# Patient Record
Sex: FEMALE | Race: WHITE | Employment: OTHER | URBAN - METROPOLITAN AREA
[De-identification: names, ages, dates, MRNs, and addresses within clinical notes are randomized per-mention and may not be internally consistent; named-entity substitution may affect disease eponyms.]

---

## 2019-01-31 ENCOUNTER — APPOINTMENT (OUTPATIENT)
Dept: GENERAL RADIOLOGY | Facility: HOSPITAL | Age: 44
DRG: 101 | End: 2019-01-31
Attending: EMERGENCY MEDICINE
Payer: COMMERCIAL

## 2019-01-31 ENCOUNTER — APPOINTMENT (OUTPATIENT)
Dept: CT IMAGING | Facility: HOSPITAL | Age: 44
DRG: 101 | End: 2019-01-31
Attending: EMERGENCY MEDICINE
Payer: COMMERCIAL

## 2019-01-31 ENCOUNTER — HOSPITAL ENCOUNTER (INPATIENT)
Facility: HOSPITAL | Age: 44
LOS: 1 days | Discharge: HOME | DRG: 101 | End: 2019-02-02
Attending: EMERGENCY MEDICINE | Admitting: HOSPITALIST
Payer: COMMERCIAL

## 2019-01-31 DIAGNOSIS — R03.0 ELEVATED BLOOD PRESSURE READING: ICD-10-CM

## 2019-01-31 DIAGNOSIS — S02.32XA CLOSED FRACTURE OF LEFT ORBITAL FLOOR, INITIAL ENCOUNTER (HCC): ICD-10-CM

## 2019-01-31 DIAGNOSIS — S09.90XA INJURY OF HEAD, INITIAL ENCOUNTER: ICD-10-CM

## 2019-01-31 DIAGNOSIS — R56.9 SEIZURE (HCC): Primary | ICD-10-CM

## 2019-01-31 PROBLEM — R73.9 HYPERGLYCEMIA: Status: ACTIVE | Noted: 2019-01-31

## 2019-01-31 PROBLEM — E87.20 METABOLIC ACIDOSIS: Status: ACTIVE | Noted: 2019-01-31

## 2019-01-31 PROBLEM — E87.2 METABOLIC ACIDOSIS: Status: ACTIVE | Noted: 2019-01-31

## 2019-01-31 LAB
ANION GAP SERPL CALC-SCNC: 15 MMOL/L (ref 0–18)
B-HCG UR QL: NEGATIVE
BASOPHILS # BLD AUTO: 0.06 X10(3) UL (ref 0–0.2)
BASOPHILS NFR BLD AUTO: 0.6 %
BILIRUB UR QL: NEGATIVE
BUN SERPL-MCNC: 15 MG/DL (ref 8–20)
BUN/CREAT SERPL: 16.9 (ref 10–20)
CALCIUM SERPL-MCNC: 9.4 MG/DL (ref 8.5–10.5)
CANNABINOIDS UR QL SCN: DETECTED
CHLORIDE SERPL-SCNC: 103 MMOL/L (ref 95–110)
CO2 SERPL-SCNC: 19 MMOL/L (ref 22–32)
COLOR UR: YELLOW
CREAT SERPL-MCNC: 0.89 MG/DL (ref 0.5–1.5)
DEPRECATED RDW RBC AUTO: 47.7 FL (ref 35.1–46.3)
EOSINOPHIL # BLD AUTO: 0.18 X10(3) UL (ref 0–0.7)
EOSINOPHIL NFR BLD AUTO: 1.7 %
ERYTHROCYTE [DISTWIDTH] IN BLOOD BY AUTOMATED COUNT: 13.9 % (ref 11–15)
ETHANOL SERPL-MCNC: 1 MG/DL
GLUCOSE SERPL-MCNC: 106 MG/DL (ref 70–99)
GLUCOSE UR-MCNC: NEGATIVE MG/DL
HCT VFR BLD AUTO: 40.5 % (ref 35–48)
HGB BLD-MCNC: 13.5 G/DL (ref 12–16)
IMM GRANULOCYTES # BLD AUTO: 0.05 X10(3) UL (ref 0–1)
IMM GRANULOCYTES NFR BLD: 0.5 %
KETONES UR-MCNC: NEGATIVE MG/DL
LEUKOCYTE ESTERASE UR QL STRIP.AUTO: NEGATIVE
LYMPHOCYTES # BLD AUTO: 2.99 X10(3) UL (ref 1–4)
LYMPHOCYTES NFR BLD AUTO: 27.8 %
MCH RBC QN AUTO: 30.8 PG (ref 26–34)
MCHC RBC AUTO-ENTMCNC: 33.3 G/DL (ref 31–37)
MCV RBC AUTO: 92.5 FL (ref 80–100)
MONOCYTES # BLD AUTO: 0.69 X10(3) UL (ref 0.1–1)
MONOCYTES NFR BLD AUTO: 6.4 %
NEUTROPHILS # BLD AUTO: 6.78 X10 (3) UL (ref 1.5–7.7)
NEUTROPHILS # BLD AUTO: 6.78 X10(3) UL (ref 1.5–7.7)
NEUTROPHILS NFR BLD AUTO: 63 %
NITRITE UR QL STRIP.AUTO: NEGATIVE
OSMOLALITY UR CALC.SUM OF ELEC: 285 MOSM/KG (ref 275–295)
PH UR: 5 [PH] (ref 5–8)
PLATELET # BLD AUTO: 598 10(3)UL (ref 150–450)
POTASSIUM SERPL-SCNC: 3.9 MMOL/L (ref 3.3–5.1)
PROT UR-MCNC: NEGATIVE MG/DL
RBC # BLD AUTO: 4.38 X10(6)UL (ref 3.8–5.3)
RBC #/AREA URNS AUTO: 4 /HPF
SODIUM SERPL-SCNC: 137 MMOL/L (ref 136–144)
SP GR UR STRIP: 1.02 (ref 1–1.03)
TROPONIN I SERPL-MCNC: 0 NG/ML (ref ?–0.03)
UROBILINOGEN UR STRIP-ACNC: <2
VIT C UR-MCNC: NEGATIVE MG/DL
WBC # BLD AUTO: 10.8 X10(3) UL (ref 4–11)
WBC #/AREA URNS AUTO: 3 /HPF

## 2019-01-31 PROCEDURE — 72125 CT NECK SPINE W/O DYE: CPT | Performed by: EMERGENCY MEDICINE

## 2019-01-31 PROCEDURE — 71045 X-RAY EXAM CHEST 1 VIEW: CPT | Performed by: EMERGENCY MEDICINE

## 2019-01-31 PROCEDURE — 99223 1ST HOSP IP/OBS HIGH 75: CPT | Performed by: OTHER

## 2019-01-31 PROCEDURE — 70450 CT HEAD/BRAIN W/O DYE: CPT | Performed by: EMERGENCY MEDICINE

## 2019-01-31 PROCEDURE — 70486 CT MAXILLOFACIAL W/O DYE: CPT | Performed by: EMERGENCY MEDICINE

## 2019-01-31 PROCEDURE — 3E023GC INTRODUCTION OF OTHER THERAPEUTIC SUBSTANCE INTO MUSCLE, PERCUTANEOUS APPROACH: ICD-10-PCS | Performed by: HOSPITALIST

## 2019-01-31 PROCEDURE — 99223 1ST HOSP IP/OBS HIGH 75: CPT | Performed by: HOSPITALIST

## 2019-01-31 RX ORDER — BUPROPION HYDROCHLORIDE 300 MG/1
450 TABLET ORAL DAILY
Status: ON HOLD | COMMUNITY
End: 2019-02-02

## 2019-01-31 RX ORDER — BUPROPION HYDROCHLORIDE 150 MG/1
450 TABLET ORAL DAILY
Status: DISCONTINUED | OUTPATIENT
Start: 2019-02-01 | End: 2019-02-01

## 2019-01-31 RX ORDER — ONDANSETRON 2 MG/ML
4 INJECTION INTRAMUSCULAR; INTRAVENOUS EVERY 6 HOURS PRN
Status: DISCONTINUED | OUTPATIENT
Start: 2019-01-31 | End: 2019-02-02

## 2019-01-31 RX ORDER — ALPRAZOLAM 1 MG/1
1 TABLET ORAL 3 TIMES DAILY PRN
Status: DISCONTINUED | OUTPATIENT
Start: 2019-01-31 | End: 2019-02-01

## 2019-01-31 RX ORDER — LORAZEPAM 2 MG/ML
1 INJECTION INTRAMUSCULAR ONCE
Status: COMPLETED | OUTPATIENT
Start: 2019-01-31 | End: 2019-01-31

## 2019-01-31 RX ORDER — LORAZEPAM 2 MG/ML
2 INJECTION INTRAMUSCULAR
Status: DISCONTINUED | OUTPATIENT
Start: 2019-01-31 | End: 2019-02-02

## 2019-01-31 RX ORDER — ESCITALOPRAM OXALATE 20 MG/1
20 TABLET ORAL DAILY
COMMUNITY

## 2019-01-31 RX ORDER — ALPRAZOLAM 1 MG/1
1 TABLET ORAL 3 TIMES DAILY PRN
Status: ON HOLD | COMMUNITY
End: 2019-02-02

## 2019-01-31 RX ORDER — ESCITALOPRAM OXALATE 10 MG/1
20 TABLET ORAL DAILY
Status: DISCONTINUED | OUTPATIENT
Start: 2019-02-01 | End: 2019-02-02

## 2019-01-31 RX ORDER — ACETAMINOPHEN 325 MG/1
650 TABLET ORAL EVERY 6 HOURS PRN
Status: DISCONTINUED | OUTPATIENT
Start: 2019-01-31 | End: 2019-02-02

## 2019-01-31 RX ORDER — SODIUM CHLORIDE 9 MG/ML
INJECTION, SOLUTION INTRAVENOUS CONTINUOUS
Status: DISCONTINUED | OUTPATIENT
Start: 2019-01-31 | End: 2019-02-02

## 2019-01-31 RX ORDER — HYDROCODONE BITARTRATE AND ACETAMINOPHEN 10; 325 MG/1; MG/1
1 TABLET ORAL EVERY 6 HOURS PRN
Status: DISCONTINUED | OUTPATIENT
Start: 2019-01-31 | End: 2019-02-02

## 2019-01-31 RX ORDER — MELOXICAM 7.5 MG/1
15 TABLET ORAL DAILY
COMMUNITY

## 2019-01-31 RX ORDER — SODIUM CHLORIDE 0.9 % (FLUSH) 0.9 %
3 SYRINGE (ML) INJECTION AS NEEDED
Status: DISCONTINUED | OUTPATIENT
Start: 2019-01-31 | End: 2019-02-02

## 2019-01-31 RX ORDER — ATORVASTATIN CALCIUM 10 MG/1
10 TABLET, FILM COATED ORAL DAILY
Status: DISCONTINUED | OUTPATIENT
Start: 2019-02-01 | End: 2019-02-02

## 2019-01-31 RX ORDER — SIMVASTATIN 20 MG
20 TABLET ORAL DAILY
COMMUNITY
End: 2020-02-18

## 2019-01-31 RX ORDER — BUPROPION HYDROCHLORIDE 100 MG/1
450 TABLET ORAL DAILY
Status: ON HOLD | COMMUNITY
End: 2019-01-31 | Stop reason: CLARIF

## 2019-01-31 RX ORDER — HYDROCODONE BITARTRATE AND ACETAMINOPHEN 10; 325 MG/1; MG/1
1 TABLET ORAL EVERY 6 HOURS PRN
COMMUNITY
End: 2021-11-18

## 2019-01-31 NOTE — ED INITIAL ASSESSMENT (HPI)
Pt was a work as a caretaker. Per EMS she fell on the ground and started shaking. Pt denies hx of seizures. Lac noted to right eyebrow and bridge of nose. Brushing noted to right eye.

## 2019-01-31 NOTE — ED PROVIDER NOTES
Patient Seen in: [facility] Emergency Department    History   Patient presents with:  Seizure Disorder (neurologic)    Stated Complaint: seizure    HPI    Patient presents the emergency department after having a seizure at work.   She states that s step-off. Eyes: Conjunctivae and EOM are normal. Pupils are equal, round, and reactive to light. Neck: Normal range of motion. Neck supple. Cardiovascular: Normal rate and regular rhythm. No murmur heard.   Pulmonary/Chest: Effort normal and breath view results for these tests on the individual orders.    RAINBOW DRAW BLUE   RAINBOW DRAW LAVENDER   RAINBOW DRAW DARK GREEN   RAINBOW DRAW LIGHT GREEN   RAINBOW DRAW GOLD   RAINBOW DRAW LAVENDER TALL (BNP)     EKG    Rate, intervals and axes as noted on E 1/31/2019  CONCLUSION: Mild interstitial edema.     Dictated by (CST): Vicente Luna MD on 1/31/2019 at 18:20     Approved by (CST): Vicente Luna MD on 1/31/2019 at 18:21            Radiology exams  Viewed and reviewed by myself and findings discussed with shauna

## 2019-02-01 ENCOUNTER — APPOINTMENT (OUTPATIENT)
Dept: MRI IMAGING | Facility: HOSPITAL | Age: 44
DRG: 101 | End: 2019-02-01
Attending: Other
Payer: COMMERCIAL

## 2019-02-01 LAB
ANION GAP SERPL CALC-SCNC: 9 MMOL/L (ref 0–18)
BASOPHILS # BLD AUTO: 0.05 X10(3) UL (ref 0–0.2)
BASOPHILS NFR BLD AUTO: 0.5 %
BUN SERPL-MCNC: 14 MG/DL (ref 8–20)
BUN/CREAT SERPL: 19.4 (ref 10–20)
CALCIUM SERPL-MCNC: 8.7 MG/DL (ref 8.5–10.5)
CHLORIDE SERPL-SCNC: 105 MMOL/L (ref 95–110)
CO2 SERPL-SCNC: 22 MMOL/L (ref 22–32)
CREAT SERPL-MCNC: 0.72 MG/DL (ref 0.5–1.5)
DEPRECATED RDW RBC AUTO: 49 FL (ref 35.1–46.3)
EOSINOPHIL # BLD AUTO: 0.3 X10(3) UL (ref 0–0.7)
EOSINOPHIL NFR BLD AUTO: 3 %
ERYTHROCYTE [DISTWIDTH] IN BLOOD BY AUTOMATED COUNT: 14.2 % (ref 11–15)
GLUCOSE SERPL-MCNC: 93 MG/DL (ref 70–99)
HCT VFR BLD AUTO: 39.9 % (ref 35–48)
HGB BLD-MCNC: 13.1 G/DL (ref 12–16)
IMM GRANULOCYTES # BLD AUTO: 0.04 X10(3) UL (ref 0–1)
IMM GRANULOCYTES NFR BLD: 0.4 %
LYMPHOCYTES # BLD AUTO: 3.34 X10(3) UL (ref 1–4)
LYMPHOCYTES NFR BLD AUTO: 32.9 %
MAGNESIUM SERPL-MCNC: 2.1 MG/DL (ref 1.8–2.5)
MCH RBC QN AUTO: 31 PG (ref 26–34)
MCHC RBC AUTO-ENTMCNC: 32.8 G/DL (ref 31–37)
MCV RBC AUTO: 94.3 FL (ref 80–100)
MONOCYTES # BLD AUTO: 0.77 X10(3) UL (ref 0.1–1)
MONOCYTES NFR BLD AUTO: 7.6 %
NEUTROPHILS # BLD AUTO: 5.64 X10 (3) UL (ref 1.5–7.7)
NEUTROPHILS # BLD AUTO: 5.64 X10(3) UL (ref 1.5–7.7)
NEUTROPHILS NFR BLD AUTO: 55.6 %
OSMOLALITY UR CALC.SUM OF ELEC: 282 MOSM/KG (ref 275–295)
PLATELET # BLD AUTO: 523 10(3)UL (ref 150–450)
POTASSIUM SERPL-SCNC: 3.5 MMOL/L (ref 3.3–5.1)
RBC # BLD AUTO: 4.23 X10(6)UL (ref 3.8–5.3)
SODIUM SERPL-SCNC: 136 MMOL/L (ref 136–144)
WBC # BLD AUTO: 10.1 X10(3) UL (ref 4–11)

## 2019-02-01 PROCEDURE — 99233 SBSQ HOSP IP/OBS HIGH 50: CPT | Performed by: HOSPITALIST

## 2019-02-01 PROCEDURE — 70553 MRI BRAIN STEM W/O & W/DYE: CPT | Performed by: OTHER

## 2019-02-01 PROCEDURE — 99233 SBSQ HOSP IP/OBS HIGH 50: CPT | Performed by: OTHER

## 2019-02-01 PROCEDURE — 90792 PSYCH DIAG EVAL W/MED SRVCS: CPT | Performed by: OTHER

## 2019-02-01 PROCEDURE — 95816 EEG AWAKE AND DROWSY: CPT | Performed by: OTHER

## 2019-02-01 RX ORDER — QUETIAPINE 25 MG/1
50 TABLET, FILM COATED ORAL NIGHTLY
Status: DISCONTINUED | OUTPATIENT
Start: 2019-02-01 | End: 2019-02-02

## 2019-02-01 RX ORDER — LAMOTRIGINE 25 MG/1
25 TABLET ORAL 2 TIMES DAILY
Status: DISCONTINUED | OUTPATIENT
Start: 2019-02-01 | End: 2019-02-02

## 2019-02-01 RX ORDER — CHLORDIAZEPOXIDE HYDROCHLORIDE 5 MG/1
5 CAPSULE, GELATIN COATED ORAL EVERY 8 HOURS PRN
Status: DISCONTINUED | OUTPATIENT
Start: 2019-02-01 | End: 2019-02-02

## 2019-02-01 RX ORDER — HALOPERIDOL 5 MG/ML
1 INJECTION INTRAMUSCULAR EVERY 6 HOURS PRN
Status: DISCONTINUED | OUTPATIENT
Start: 2019-02-01 | End: 2019-02-02

## 2019-02-01 NOTE — CONSULTS
[facility]    PATIENT'S NAME: Beverley Overall PHYSICIAN: Jerry Geiger MD   CONSULTING PHYSICIAN: Lesvia Bernal MD   PATIENT ACCOUNT#:   [id number]    LOCATION:  [address] [place] [address] RECORD #:   [id number]       DATE OF BIRTH:  [birth year] head report reviewed. IMPRESSION:  This is probably a Xanax withdrawal seizure. Will order an MRI of the brain, EEG.   Discussed the fact that I believe she should follow up with Psychiatry in dealing with her depression, anxiety, appropriate medication

## 2019-02-01 NOTE — ED NOTES
Orders for admission, patient is aware of plan and ready to go upstairs.  Any questions, please call ED RN Juanita Read  at extension [phone]    A/o 4, ambulatory with standby assist.

## 2019-02-01 NOTE — PROGRESS NOTES
[facility]    Progress Note    Rayna San Saba Patient Status:  Inpatient    3/15/1975 MRN [id number]   Location [address] [facility] Attending Stuart Jaramillo MD   Hosp Day # 0 PCP [facility]       Subjective:    Rayna Titus is a(n) soft tissue within the left maxillary sinus which may represent inspissated mucous versus polyp. No acute intracranial abnormality.      Dictated by (CST): Arina Bell MD on 1/31/2019 at 18:06     Approved by (CST): Arina Bell MD on 1/31/2019 at 18:14 MD on 1/31/2019 at 18:20     Approved by (CST): Ovidio Rushing MD on 1/31/2019 at 18:21          Ekg 12-lead    Result Date: 1/31/2019  ECG Report  Interpretation  -------------------------- Sinus Rhythm WITHIN NORMAL LIMITS No previous ECGs available Electr

## 2019-02-01 NOTE — SPIRITUAL CARE NOTE
Was with doctor in room. A  will check back tomorrow, or call [phone] if PT ready for visit.  Thank you, GG

## 2019-02-01 NOTE — PLAN OF CARE
ANXIETY    • Will report anxiety at manageable levels Progressing        COPING    • Pt/Family able to verbalize concerns and demonstrate effective coping strategies Progressing        NEUROLOGICAL - ADULT    • Achieves stable or improved neurological stat

## 2019-02-01 NOTE — PROGRESS NOTES
[facility]    Progress Note    Cuauhtemoc Simons Patient Status:  Inpatient    3/15/1975 MRN [id number]   Location [address] [facility] Attending Alisson Doran MD   Hosp Day # 0 PCP [facility]       Subjective:    Cuauhtemoc Simons is a(n) Review of Systems:   GENERAL HEALTH: feels well otherwise  SKIN: denies any unusual skin lesions or rashes  EYES: no visual complaints or deficits  HEENT: denies nasal congestion, sinus pain or sore throat; hearing loss negative  RESPIRATORY: denies 1/31/2019 at 18:49     Approved by (CST): Evelyn Christy MD on 1/31/2019 at 19:01          Ct Spine Cervical (cpt=72125)    Result Date: 1/31/2019  CONCLUSION:  1. No acute fracture or acute malalignment.  2. Multilevel cervical spondylosis most pronou

## 2019-02-01 NOTE — BH PROGRESS NOTE
Behavioral Health Note  CHIEF COMPLAINT  Acute seizure  REASON FOR ADMISSION  Acute seizure    SOCIAL HISTORY  Jonnie Lei lives with her . 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  She smokes 1/2 pack per day, occasional drinks alcoh communication, no physical connection, no emotional connection and she reports that her  is an alcoholic. She reports that the stress began 10 year ago when her  lost his job in real estate and their home was foreclosed on.  She reports cont

## 2019-02-01 NOTE — H&P
[facility]    PATIENT'S NAME: Veronika Sanchez   ATTENDING PHYSICIAN: Ace Urrutia.  Mckinley Naik MD   PATIENT ACCOUNT#:   [id number]    LOCATION:  [place] [address]  MEDICAL RECORD #:   [id number]       YOB: 1975  ADMISSION DATE:       01/31/ was wrapping up at work, she went to start her car, and then she does not remember what happened. She remembers getting in the emergency room. According to witnesses, the patient had a grand mal convulsive seizure.   Other 12-point review of systems is ne

## 2019-02-02 VITALS
TEMPERATURE: 97 F | HEIGHT: 67 IN | OXYGEN SATURATION: 94 % | HEART RATE: 65 BPM | SYSTOLIC BLOOD PRESSURE: 114 MMHG | RESPIRATION RATE: 18 BRPM | BODY MASS INDEX: 33.79 KG/M2 | WEIGHT: 215.31 LBS | DIASTOLIC BLOOD PRESSURE: 73 MMHG

## 2019-02-02 LAB
ANION GAP SERPL CALC-SCNC: 7 MMOL/L (ref 0–18)
BASOPHILS # BLD AUTO: 0.06 X10(3) UL (ref 0–0.2)
BASOPHILS NFR BLD AUTO: 0.6 %
BUN SERPL-MCNC: 14 MG/DL (ref 8–20)
BUN/CREAT SERPL: 17.3 (ref 10–20)
CALCIUM SERPL-MCNC: 9 MG/DL (ref 8.5–10.5)
CHLORIDE SERPL-SCNC: 106 MMOL/L (ref 95–110)
CO2 SERPL-SCNC: 25 MMOL/L (ref 22–32)
CREAT SERPL-MCNC: 0.81 MG/DL (ref 0.5–1.5)
DEPRECATED RDW RBC AUTO: 51.8 FL (ref 35.1–46.3)
EOSINOPHIL # BLD AUTO: 0.5 X10(3) UL (ref 0–0.7)
EOSINOPHIL NFR BLD AUTO: 5.2 %
ERYTHROCYTE [DISTWIDTH] IN BLOOD BY AUTOMATED COUNT: 14 % (ref 11–15)
GLUCOSE SERPL-MCNC: 90 MG/DL (ref 70–99)
HCT VFR BLD AUTO: 42.5 % (ref 35–48)
HGB BLD-MCNC: 13.4 G/DL (ref 12–16)
IMM GRANULOCYTES # BLD AUTO: 0.03 X10(3) UL (ref 0–1)
IMM GRANULOCYTES NFR BLD: 0.3 %
LYMPHOCYTES # BLD AUTO: 2.81 X10(3) UL (ref 1–4)
LYMPHOCYTES NFR BLD AUTO: 29.3 %
MAGNESIUM SERPL-MCNC: 2 MG/DL (ref 1.8–2.5)
MCH RBC QN AUTO: 31.1 PG (ref 26–34)
MCHC RBC AUTO-ENTMCNC: 31.5 G/DL (ref 31–37)
MCV RBC AUTO: 98.6 FL (ref 80–100)
MONOCYTES # BLD AUTO: 0.67 X10(3) UL (ref 0.1–1)
MONOCYTES NFR BLD AUTO: 7 %
NEUTROPHILS # BLD AUTO: 5.51 X10 (3) UL (ref 1.5–7.7)
NEUTROPHILS # BLD AUTO: 5.51 X10(3) UL (ref 1.5–7.7)
NEUTROPHILS NFR BLD AUTO: 57.6 %
OSMOLALITY UR CALC.SUM OF ELEC: 286 MOSM/KG (ref 275–295)
PLATELET # BLD AUTO: 493 10(3)UL (ref 150–450)
POTASSIUM SERPL-SCNC: 4.6 MMOL/L (ref 3.3–5.1)
POTASSIUM SERPL-SCNC: 4.6 MMOL/L (ref 3.3–5.1)
RBC # BLD AUTO: 4.31 X10(6)UL (ref 3.8–5.3)
SODIUM SERPL-SCNC: 138 MMOL/L (ref 136–144)
WBC # BLD AUTO: 9.6 X10(3) UL (ref 4–11)

## 2019-02-02 PROCEDURE — 99254 IP/OBS CNSLTJ NEW/EST MOD 60: CPT | Performed by: OTOLARYNGOLOGY

## 2019-02-02 PROCEDURE — 92511 NASOPHARYNGOSCOPY: CPT | Performed by: OTOLARYNGOLOGY

## 2019-02-02 PROCEDURE — 99239 HOSP IP/OBS DSCHRG MGMT >30: CPT | Performed by: HOSPITALIST

## 2019-02-02 RX ORDER — LAMOTRIGINE 25 MG/1
25 TABLET ORAL 2 TIMES DAILY
Qty: 60 TABLET | Refills: 0 | Status: SHIPPED | OUTPATIENT
Start: 2019-02-02 | End: 2019-03-04

## 2019-02-02 RX ORDER — QUETIAPINE 50 MG/1
50 TABLET, FILM COATED ORAL NIGHTLY
Qty: 30 TABLET | Refills: 0 | Status: SHIPPED | OUTPATIENT
Start: 2019-02-02 | End: 2019-03-04

## 2019-02-02 NOTE — PLAN OF CARE
Problem: Patient Centered Care  Goal: Patient preferences are identified and integrated in the patient's plan of care  Interventions:  - What would you like us to know as we care for you?  \"I had a seizure, how did that happen, I've never had seizures befo appropriate  Outcome: Progressing      Problem: NEUROLOGICAL - ADULT  Goal: Achieves stable or improved neurological status  INTERVENTIONS  - Assess for and report changes in neurological status  - Initiate measures to prevent increased intracranial pressu manageable levels  INTERVENTIONS:  - Administer medication as ordered  - Teach and rehearse alternative coping skills  - Provide emotional support with 1:1 interaction with staff  Outcome: Progressing      Problem: COPING  Goal: Pt/Family able to verbalize

## 2019-02-02 NOTE — PROCEDURES
[address] [facility] [address]      PATIENT'S NAME: Juana Brunner   ATTENDING PHYSICIAN: Ria Ann MD   PATIENT ACCOUNT #: [de-identified] LOCATION: [place] [address] RECORD #: [id number] DATE OF BIRTH: [birth year]

## 2019-02-02 NOTE — DISCHARGE SUMMARY
[facility]    Discharge Summary    Pammandie Enrique Patient Status:  Inpatient    3/15/1975 MRN [id number]   Location [address] [facility] Attending Roro Lobato MD   Hosp Day # 1 PCP Peter Coley     Date of Admission: 2019 Henri Ndiaye COMPLAINT:  Acute seizure. HISTORY OF PRESENT ILLNESS:  The patient is a 57-year-old  female who had a witnessed convulsive seizure earlier today. She was brought in to the emergency department for evaluation.   Chemistry showed bicarbonate of 19 Prescription details   escitalopram 20 MG Tabs  Commonly known as:  LEXAPRO      Take 20 mg by mouth daily.    Refills:  0     HYDROcodone-acetaminophen  MG Tabs  Commonly known as:  Priya Fulton  Notes to patient:  Last dose given 2/2 at 7:35 am      Take 1

## 2019-02-02 NOTE — PLAN OF CARE
Problem: Patient Centered Care  Goal: Patient preferences are identified and integrated in the patient's plan of care  Interventions:  - What would you like us to know as we care for you?  \"I had a seizure, how did that happen, I've never had seizures befo appropriate  Outcome: Adequate for Discharge      Problem: NEUROLOGICAL - ADULT  Goal: Achieves stable or improved neurological status  INTERVENTIONS  - Assess for and report changes in neurological status  - Initiate measures to prevent increased intracra ANXIETY  Goal: Will report anxiety at manageable levels  INTERVENTIONS:  - Administer medication as ordered  - Teach and rehearse alternative coping skills  - Provide emotional support with 1:1 interaction with staff  Outcome: Adequate for Discharge      P

## 2019-02-02 NOTE — PLAN OF CARE
Problem: Patient Centered Care  Goal: Patient preferences are identified and integrated in the patient's plan of care  Interventions:  - What would you like us to know as we care for you?  \"I had a seizure, how did that happen, I've never had seizures befo increased pain with activity and pre-medicate as appropriate  Outcome: Progressing  Facial pain and headaches d/t fractures of left orbital and right nasal. PRN norco given for pain.       Problem: NEUROLOGICAL - ADULT  Goal: Achieves stable or improved juliet appropriate  - Consider OT/PT consult to assist with strengthening/mobility  - Encourage toileting schedule  Outcome: Progressing  Up with assist, no assistive device, calls appropriately. Ambulates with a strong and steady gait.      Problem: ANXIETY  Goal

## 2019-02-02 NOTE — CONSULTS
[facility]    Report of Consultation    Aubree Weiss Patient Status:  Inpatient    3/15/1975 MRN [id number]   Location [address] [facility] Attending Mei Bellamy MD   Hosp Day # 1 PCP Yuly Lopes     Date of Admission:   Problem Relation Age of Onset   • Cancer Father    • Hypertension Father    • Depression Mother    • Anxiety Mother    • Depression Daughter    • Anxiety Sister        Social History  Patient Guardian Status:  Not on file    Other Topics            Jessie comprehensive review of systems was negative. Physical Exam:   Blood pressure 119/79, pulse 67, temperature 97.7 °F (36.5 °C), temperature source Oral, resp.  rate 18, height 5' 7\" (1.702 m), weight 215 lb 4.8 oz (97.7 kg), last menstrual period 01/10/2 demonstrated mucosal swelling as well as some debris. No masses or lesions are noted in the nasal cavity. The scope was driven into the nasopharynx demonstrating bilateral soft tissue structures which appear to be adenoidal tissue.   There was some old bl visible lesion. SINUSES: There is mild mucosal thickening of the ethmoid air cells. Moderate mucosal thickening is seen of the left maxillary sinus with hyperdense fluid and debris measuring up to 73 Hounsfield units.  ORBITS: Limited views are unremarkabl unremarkable. UPPER AIRWAY: Normal.  The nasopharynx, oropharynx, and oral cavity are unremarkable. NECK: No lymphadenopathy. OTHER: Advanced arthropathy bilateral TMJs.                                            Cavernous carotid vascular calcifications C1-C2: Advanced C1-2 arthropathy between the lateral masses and anterior arch/tip at the dens. C2-C3: Mild diffuse disc bulge without significant thecal sac effacement or.  Moderate left hypertrophic facet arthrosis C3-C4: Mild central disc bulge C4-C5: Mil cisterns, and sulci are appropriate for age. No hydrocephalus, subarachnoid hemorrhage, or mass. SKULL: No mass or other significant visible lesion. SINUSES: Mild mucosal thickening in ethmoid sinuses. Small maxillary sinus retention cysts.  Trace mastoi left orbital floor fracture. Relatively nondisplaced nasal fracture. Nasopharyngeal soft tissues most likely adenoidal tissue. Recommendations:  I reviewed her CT images which demonstrates the above-mentioned orbital and nasal fractures.   In addition

## 2019-02-03 NOTE — BH PROGRESS NOTE
Met with pt to schedule appointment to begin PHP dual at [facility], pt stated she need a program closer to her home on [address]. Patient also requires transportation due to seizures.  Patient given [facility]

## 2019-02-04 NOTE — PAYOR COMM NOTE
--------------  ADMISSION REVIEW     Payor: [facility]  Subscriber #:  [id number]  Authorization Number: [id number]    Admit date: 2/1/19  Admit time: 7078  DISCHARGED 2/2         Admitting Physician: Shahzad Kirby MD  Attending Physician: Temp 98.7 °F (37.1 °C)   Temp src Oral   SpO2 99 %   O2 Device None (Room air)       Current:BP (!) 159/100   Pulse 70   Temp 98.7 °F (37.1 °C) (Oral)   Resp 18   Ht 170.2 cm (5' 7\")   Wt 95.3 kg   LMP 01/10/2019   SpO2 98%   BMI 32.89 kg/m²          Phys DRUG SCREEN 7 W/OUT CONFIRMATION, URINE - Abnormal; Notable for the following components:    UR. Marijuana Screen Detected (*)     All other components within normal limits   CBC W/ DIFFERENTIAL - Abnormal; Notable for the following components:    RDW-SD 4 CONCLUSION:  1. Subtle left orbital floor fracture with minimal 2 mm depression. No optic nerve or muscle entrapment. 2. Mucosal thickening bilateral maxillary sinuses. Small amount of hemorrhage left maxillary sinus.  3. Minimally depressed left nasal bone H&P signed by Medina Vee MD at 2/1/2019  2:08 PM      Author:  Medina Vee MD Service:  Hospitalist Author Type:  Physician    Filed:  2/1/2019  2:08 PM Status:  Signed    :  Medina Vee MD (Physician)         [facility] SOCIAL HISTORY:  She does not smoke. Occasional alcohol. She smokes marijuana on occasion. 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     REVIEW OF SYSTEMS:  The patient said that prior to the seizure she was wrappi Dictated By Merrick Eng MD  d: 01/31/2019 18:50:38  t: 01/31/2019 19:30:30  Job [id number]  XA/    Electronically signed by Amparo Sanchez MD on 2/1/2019  2:08 PM       Albert Bettencourt MD   Physician   Hospitalist   Progress Notes   [place]    02/01/2019     CO2 22 02/01/2019     GLU 93 02/01/2019     CA 8.7 02/01/2019     MG 2.1 02/01/2019     TROP 0.00 01/31/2019     ETOH 1 01/31/2019         Ct Brain Or Head ([id number])     Result Date: 1/31/2019  CONCLUSION:   Mild right supraorbital an CONCLUSION:  1. No acute infarct or other acute finding. 2. Marked nonspecific thickening of the posterior nasopharyngeal soft tissues with narrowing of the nasopharyngeal airway.  Differential considerations include atypical benign hyperplasia, and less li Patient will require inpatient services that will reasonably be expected to span two midnight's based on the clinical documentation in H+P. Based on patients current state of illness, I anticipate that, after discharge, patient will require TBD. Closed fracture of left orbital floor, initial encounter ([facility] [address]     Moderate depressed bipolar I disorder (HCC)     Generalized anxiety disorder     Nasopharyngeal mass, benign           Physical Exam:   General appearance: alert, appears stated age and Consultations:   Psych  ENT     Procedures:   EEG     Complications: n/a     Discharge Condition: Good     Discharge Medications:               Discharge Medications              START taking these medications      Instructions Prescription details   Juan Jennifer Hendricks MD  2/2/2019  11:23 AM     > 35 min          Signed by Katina Hudson MD on 2/2/2019  1:30 PM

## 2019-02-05 NOTE — PAYOR COMM NOTE
--------------  DISCHARGE REVIEW    Payor: [facility]  Subscriber #:  [id number]  Authorization Number: [id number]    Admit date: 2/1/19  Admit time:  2107  Discharge Date: 2/2/2019  1:51 PM     Admitting Physician: Trinh Slade MD  Attendi blood pressure reading     Closed fracture of left orbital floor, initial encounter (HCC)     Moderate depressed bipolar I disorder (HCC)     Generalized anxiety disorder     Nasopharyngeal mass, benign        Physical Exam:   General appearance: alert, ap acidosis  -resolved        Consultations:   Psych  ENT    Procedures:   EEG    Complications: n/a    Discharge Condition: Good    Discharge Medications:      Discharge Medications      START taking these medications      Instructions Prescription details Electronically signed by Lorena Garcia MD on 2/2/2019  1:30 PM         REVIEWER COMMENTS

## 2019-03-03 NOTE — PAYOR COMM NOTE
--------------  DISCHARGE REVIEW    [facility]  Subscriber #:  [id number]  Authorization Number: [id number]    Admit date: 2/1/19  Admit time:  0679  Discharge Date: 2/2/2019  1:51 PM     Admitting Physician: Noe Diaz MD  Attendi

## 2019-12-05 ENCOUNTER — HOSPITAL ENCOUNTER (OUTPATIENT)
Dept: CT IMAGING | Facility: HOSPITAL | Age: 44
Discharge: HOME | End: 2019-12-05
Attending: ORTHOPAEDIC SURGERY
Payer: COMMERCIAL

## 2019-12-05 ENCOUNTER — LAB REQUISITION (OUTPATIENT)
Dept: LAB | Facility: HOSPITAL | Age: 44
End: 2019-12-05
Payer: COMMERCIAL

## 2019-12-05 ENCOUNTER — LAB ENCOUNTER (OUTPATIENT)
Dept: LAB | Facility: HOSPITAL | Age: 44
End: 2019-12-05
Attending: ORTHOPAEDIC SURGERY
Payer: COMMERCIAL

## 2019-12-05 DIAGNOSIS — Z96.659 PAIN DUE TO TOTAL KNEE REPLACEMENT (HCC): ICD-10-CM

## 2019-12-05 DIAGNOSIS — M17.0 BILATERAL PRIMARY OSTEOARTHRITIS OF KNEE: ICD-10-CM

## 2019-12-05 DIAGNOSIS — Z96.659 HX OF TOTAL KNEE ARTHROPLASTY: ICD-10-CM

## 2019-12-05 DIAGNOSIS — M25.561 RIGHT KNEE PAIN: Primary | ICD-10-CM

## 2019-12-05 DIAGNOSIS — T84.84XA PAIN DUE TO TOTAL KNEE REPLACEMENT (HCC): ICD-10-CM

## 2019-12-05 PROCEDURE — 73700 CT LOWER EXTREMITY W/O DYE: CPT | Performed by: ORTHOPAEDIC SURGERY

## 2019-12-05 PROCEDURE — 87205 SMEAR GRAM STAIN: CPT | Performed by: ORTHOPAEDIC SURGERY

## 2019-12-05 PROCEDURE — 86140 C-REACTIVE PROTEIN: CPT

## 2019-12-05 PROCEDURE — 87070 CULTURE OTHR SPECIMN AEROBIC: CPT | Performed by: ORTHOPAEDIC SURGERY

## 2019-12-05 PROCEDURE — 89060 EXAM SYNOVIAL FLUID CRYSTALS: CPT | Performed by: ORTHOPAEDIC SURGERY

## 2019-12-05 PROCEDURE — 36415 COLL VENOUS BLD VENIPUNCTURE: CPT

## 2019-12-05 PROCEDURE — 89051 BODY FLUID CELL COUNT: CPT | Performed by: ORTHOPAEDIC SURGERY

## 2019-12-05 PROCEDURE — 85025 COMPLETE CBC W/AUTO DIFF WBC: CPT

## 2019-12-05 PROCEDURE — 85652 RBC SED RATE AUTOMATED: CPT

## 2019-12-05 PROCEDURE — 89050 BODY FLUID CELL COUNT: CPT | Performed by: ORTHOPAEDIC SURGERY

## 2020-02-18 RX ORDER — ALPRAZOLAM 1 MG/1
1 TABLET ORAL 3 TIMES DAILY
COMMUNITY

## 2020-02-18 RX ORDER — METOCLOPRAMIDE 10 MG/1
10 TABLET ORAL ONCE
Status: CANCELLED | OUTPATIENT
Start: 2020-02-18 | End: 2020-02-18

## 2020-02-18 RX ORDER — OMEPRAZOLE 20 MG/1
20 CAPSULE, DELAYED RELEASE ORAL EVERY MORNING
COMMUNITY

## 2020-02-20 ENCOUNTER — LAB ENCOUNTER (OUTPATIENT)
Dept: LAB | Facility: HOSPITAL | Age: 45
End: 2020-02-20
Attending: ORTHOPAEDIC SURGERY
Payer: COMMERCIAL

## 2020-02-20 DIAGNOSIS — Z01.818 PRE-OP TESTING: ICD-10-CM

## 2020-02-20 LAB
ANTIBODY SCREEN: NEGATIVE
RH BLOOD TYPE: POSITIVE

## 2020-02-20 PROCEDURE — 86901 BLOOD TYPING SEROLOGIC RH(D): CPT

## 2020-02-20 PROCEDURE — 86900 BLOOD TYPING SEROLOGIC ABO: CPT

## 2020-02-20 PROCEDURE — 36415 COLL VENOUS BLD VENIPUNCTURE: CPT

## 2020-02-20 PROCEDURE — 86850 RBC ANTIBODY SCREEN: CPT

## 2020-02-20 NOTE — H&P
[address] [facility] Patient Status:  Surgery Admit - Inpt    3/15/1975 MRN [id number]   Location [place] [address] Attending Marlin Reddy, *   Hosp Day # 0 PCP Janna Mireles drinks socially-once every month    Drug use: Yes      Frequency: 4.0 times per week      Types: Cannabis      Comment: medical marijuana    Allergies/Medications: Allergies:   Sulfa Antibiotics       RASH  No medications prior to admission.       Review

## 2020-02-25 ENCOUNTER — APPOINTMENT (OUTPATIENT)
Dept: GENERAL RADIOLOGY | Facility: HOSPITAL | Age: 45
DRG: 468 | End: 2020-02-25
Attending: PHYSICIAN ASSISTANT
Payer: COMMERCIAL

## 2020-02-25 ENCOUNTER — ANESTHESIA EVENT (OUTPATIENT)
Dept: SURGERY | Facility: HOSPITAL | Age: 45
DRG: 468 | End: 2020-02-25
Payer: COMMERCIAL

## 2020-02-25 ENCOUNTER — HOSPITAL ENCOUNTER (INPATIENT)
Facility: HOSPITAL | Age: 45
LOS: 3 days | Discharge: HOME HEALTH | DRG: 468 | End: 2020-02-28
Attending: ORTHOPAEDIC SURGERY | Admitting: ORTHOPAEDIC SURGERY
Payer: COMMERCIAL

## 2020-02-25 ENCOUNTER — ANESTHESIA (OUTPATIENT)
Dept: SURGERY | Facility: HOSPITAL | Age: 45
DRG: 468 | End: 2020-02-25
Payer: COMMERCIAL

## 2020-02-25 DIAGNOSIS — Z01.818 PRE-OP TESTING: Primary | ICD-10-CM

## 2020-02-25 PROBLEM — Z96.659 FAILED TOTAL KNEE ARTHROPLASTY: Status: ACTIVE | Noted: 2020-02-25

## 2020-02-25 PROBLEM — T84.018A FAILED TOTAL KNEE ARTHROPLASTY, INITIAL ENCOUNTER (HCC): Status: ACTIVE | Noted: 2020-02-25

## 2020-02-25 PROBLEM — Z96.659 FAILED TOTAL KNEE ARTHROPLASTY, INITIAL ENCOUNTER: Status: ACTIVE | Noted: 2020-02-25

## 2020-02-25 PROBLEM — T84.018A FAILED TOTAL KNEE ARTHROPLASTY, INITIAL ENCOUNTER: Status: ACTIVE | Noted: 2020-02-25

## 2020-02-25 PROBLEM — Z96.659 FAILED TOTAL KNEE ARTHROPLASTY, INITIAL ENCOUNTER (HCC): Status: ACTIVE | Noted: 2020-02-25

## 2020-02-25 PROBLEM — M06.9 RHEUMATOID ARTHRITIS (HCC): Chronic | Status: ACTIVE | Noted: 2020-02-25

## 2020-02-25 PROBLEM — T84.018A FAILED TOTAL KNEE ARTHROPLASTY: Status: ACTIVE | Noted: 2020-02-25

## 2020-02-25 PROBLEM — T84.018A FAILED TOTAL KNEE ARTHROPLASTY (HCC): Status: ACTIVE | Noted: 2020-02-25

## 2020-02-25 PROBLEM — Z96.659 FAILED TOTAL KNEE ARTHROPLASTY (HCC): Status: ACTIVE | Noted: 2020-02-25

## 2020-02-25 LAB
B-HCG UR QL: NEGATIVE
DEPRECATED RDW RBC AUTO: 48.5 FL (ref 35.1–46.3)
ERYTHROCYTE [DISTWIDTH] IN BLOOD BY AUTOMATED COUNT: 14.3 % (ref 11–15)
HCT VFR BLD AUTO: 37.2 % (ref 35–48)
HGB BLD-MCNC: 12.4 G/DL (ref 12–16)
MCH RBC QN AUTO: 31.3 PG (ref 26–34)
MCHC RBC AUTO-ENTMCNC: 33.3 G/DL (ref 31–37)
MCV RBC AUTO: 93.9 FL (ref 80–100)
PLATELET # BLD AUTO: 387 10(3)UL (ref 150–450)
RBC # BLD AUTO: 3.96 X10(6)UL (ref 3.8–5.3)
WBC # BLD AUTO: 11.6 X10(3) UL (ref 4–11)

## 2020-02-25 PROCEDURE — 73560 X-RAY EXAM OF KNEE 1 OR 2: CPT | Performed by: PHYSICIAN ASSISTANT

## 2020-02-25 PROCEDURE — 0SRC0J9 REPLACEMENT OF RIGHT KNEE JOINT WITH SYNTHETIC SUBSTITUTE, CEMENTED, OPEN APPROACH: ICD-10-PCS | Performed by: ORTHOPAEDIC SURGERY

## 2020-02-25 PROCEDURE — 0SPC0JZ REMOVAL OF SYNTHETIC SUBSTITUTE FROM RIGHT KNEE JOINT, OPEN APPROACH: ICD-10-PCS | Performed by: ORTHOPAEDIC SURGERY

## 2020-02-25 PROCEDURE — 0SBC0ZZ EXCISION OF RIGHT KNEE JOINT, OPEN APPROACH: ICD-10-PCS | Performed by: ORTHOPAEDIC SURGERY

## 2020-02-25 PROCEDURE — 99232 SBSQ HOSP IP/OBS MODERATE 35: CPT | Performed by: HOSPITALIST

## 2020-02-25 DEVICE — KIT FEM BONE CEMENT RESTRICTOR: Type: IMPLANTABLE DEVICE | Site: KNEE | Status: FUNCTIONAL

## 2020-02-25 DEVICE — REFOBACIN BC R 1X40 US: Type: IMPLANTABLE DEVICE | Site: KNEE | Status: FUNCTIONAL

## 2020-02-25 RX ORDER — PHENYLEPHRINE HCL 10 MG/ML
VIAL (ML) INJECTION AS NEEDED
Status: DISCONTINUED | OUTPATIENT
Start: 2020-02-25 | End: 2020-02-25 | Stop reason: SURG

## 2020-02-25 RX ORDER — ACETAMINOPHEN 500 MG
1000 TABLET ORAL ONCE
Status: DISCONTINUED | OUTPATIENT
Start: 2020-02-25 | End: 2020-02-25 | Stop reason: HOSPADM

## 2020-02-25 RX ORDER — CEFAZOLIN SODIUM/WATER 2 G/20 ML
2 SYRINGE (ML) INTRAVENOUS ONCE
Status: COMPLETED | OUTPATIENT
Start: 2020-02-25 | End: 2020-02-25

## 2020-02-25 RX ORDER — ACETAMINOPHEN 325 MG/1
650 TABLET ORAL EVERY 4 HOURS PRN
Status: DISCONTINUED | OUTPATIENT
Start: 2020-02-25 | End: 2020-02-26

## 2020-02-25 RX ORDER — PANTOPRAZOLE SODIUM 20 MG/1
20 TABLET, DELAYED RELEASE ORAL
Status: DISCONTINUED | OUTPATIENT
Start: 2020-02-25 | End: 2020-02-28

## 2020-02-25 RX ORDER — HYDROMORPHONE HYDROCHLORIDE 1 MG/ML
0.4 INJECTION, SOLUTION INTRAMUSCULAR; INTRAVENOUS; SUBCUTANEOUS EVERY 5 MIN PRN
Status: DISCONTINUED | OUTPATIENT
Start: 2020-02-25 | End: 2020-02-25 | Stop reason: HOSPADM

## 2020-02-25 RX ORDER — MORPHINE SULFATE 10 MG/ML
6 INJECTION, SOLUTION INTRAMUSCULAR; INTRAVENOUS EVERY 10 MIN PRN
Status: DISCONTINUED | OUTPATIENT
Start: 2020-02-25 | End: 2020-02-25 | Stop reason: HOSPADM

## 2020-02-25 RX ORDER — DEXTROSE, SODIUM CHLORIDE, AND POTASSIUM CHLORIDE 5; .45; .15 G/100ML; G/100ML; G/100ML
INJECTION INTRAVENOUS CONTINUOUS
Status: DISCONTINUED | OUTPATIENT
Start: 2020-02-25 | End: 2020-02-28

## 2020-02-25 RX ORDER — SODIUM CHLORIDE, SODIUM LACTATE, POTASSIUM CHLORIDE, CALCIUM CHLORIDE 600; 310; 30; 20 MG/100ML; MG/100ML; MG/100ML; MG/100ML
INJECTION, SOLUTION INTRAVENOUS CONTINUOUS
Status: DISCONTINUED | OUTPATIENT
Start: 2020-02-25 | End: 2020-02-25 | Stop reason: HOSPADM

## 2020-02-25 RX ORDER — FAMOTIDINE 20 MG/1
20 TABLET ORAL 2 TIMES DAILY
Status: DISCONTINUED | OUTPATIENT
Start: 2020-02-25 | End: 2020-02-25

## 2020-02-25 RX ORDER — BISACODYL 10 MG
10 SUPPOSITORY, RECTAL RECTAL
Status: DISCONTINUED | OUTPATIENT
Start: 2020-02-25 | End: 2020-02-28

## 2020-02-25 RX ORDER — HYDROCODONE BITARTRATE AND ACETAMINOPHEN 5; 325 MG/1; MG/1
1 TABLET ORAL AS NEEDED
Status: DISCONTINUED | OUTPATIENT
Start: 2020-02-25 | End: 2020-02-25 | Stop reason: HOSPADM

## 2020-02-25 RX ORDER — MORPHINE SULFATE 4 MG/ML
4 INJECTION, SOLUTION INTRAMUSCULAR; INTRAVENOUS EVERY 2 HOUR PRN
Status: DISCONTINUED | OUTPATIENT
Start: 2020-02-25 | End: 2020-02-28

## 2020-02-25 RX ORDER — MIDAZOLAM HYDROCHLORIDE 1 MG/ML
INJECTION INTRAMUSCULAR; INTRAVENOUS AS NEEDED
Status: DISCONTINUED | OUTPATIENT
Start: 2020-02-25 | End: 2020-02-25 | Stop reason: SURG

## 2020-02-25 RX ORDER — SODIUM PHOSPHATE, DIBASIC AND SODIUM PHOSPHATE, MONOBASIC 7; 19 G/133ML; G/133ML
1 ENEMA RECTAL ONCE AS NEEDED
Status: DISCONTINUED | OUTPATIENT
Start: 2020-02-25 | End: 2020-02-28

## 2020-02-25 RX ORDER — VANCOMYCIN HYDROCHLORIDE
15 ONCE
Status: COMPLETED | OUTPATIENT
Start: 2020-02-25 | End: 2020-02-25

## 2020-02-25 RX ORDER — MORPHINE SULFATE 2 MG/ML
2 INJECTION, SOLUTION INTRAMUSCULAR; INTRAVENOUS EVERY 2 HOUR PRN
Status: DISCONTINUED | OUTPATIENT
Start: 2020-02-25 | End: 2020-02-28

## 2020-02-25 RX ORDER — POLYETHYLENE GLYCOL 3350 17 G/17G
17 POWDER, FOR SOLUTION ORAL DAILY PRN
Status: DISCONTINUED | OUTPATIENT
Start: 2020-02-25 | End: 2020-02-28

## 2020-02-25 RX ORDER — FAMOTIDINE 10 MG/ML
20 INJECTION, SOLUTION INTRAVENOUS 2 TIMES DAILY
Status: DISCONTINUED | OUTPATIENT
Start: 2020-02-25 | End: 2020-02-25

## 2020-02-25 RX ORDER — ALPRAZOLAM 1 MG/1
1 TABLET ORAL 3 TIMES DAILY PRN
Status: DISCONTINUED | OUTPATIENT
Start: 2020-02-25 | End: 2020-02-28

## 2020-02-25 RX ORDER — ONDANSETRON 2 MG/ML
4 INJECTION INTRAMUSCULAR; INTRAVENOUS EVERY 4 HOURS PRN
Status: ACTIVE | OUTPATIENT
Start: 2020-02-25 | End: 2020-02-27

## 2020-02-25 RX ORDER — SODIUM CHLORIDE 0.9 % (FLUSH) 0.9 %
10 SYRINGE (ML) INJECTION AS NEEDED
Status: DISCONTINUED | OUTPATIENT
Start: 2020-02-25 | End: 2020-02-28

## 2020-02-25 RX ORDER — SENNOSIDES 8.6 MG
17.2 TABLET ORAL NIGHTLY
Status: DISCONTINUED | OUTPATIENT
Start: 2020-02-25 | End: 2020-02-28

## 2020-02-25 RX ORDER — MORPHINE SULFATE 4 MG/ML
2 INJECTION, SOLUTION INTRAMUSCULAR; INTRAVENOUS EVERY 10 MIN PRN
Status: DISCONTINUED | OUTPATIENT
Start: 2020-02-25 | End: 2020-02-25 | Stop reason: HOSPADM

## 2020-02-25 RX ORDER — HALOPERIDOL 5 MG/ML
0.25 INJECTION INTRAMUSCULAR ONCE AS NEEDED
Status: DISCONTINUED | OUTPATIENT
Start: 2020-02-25 | End: 2020-02-25 | Stop reason: HOSPADM

## 2020-02-25 RX ORDER — HYDROMORPHONE HYDROCHLORIDE 1 MG/ML
0.6 INJECTION, SOLUTION INTRAMUSCULAR; INTRAVENOUS; SUBCUTANEOUS EVERY 5 MIN PRN
Status: DISCONTINUED | OUTPATIENT
Start: 2020-02-25 | End: 2020-02-25 | Stop reason: HOSPADM

## 2020-02-25 RX ORDER — HYDROCODONE BITARTRATE AND ACETAMINOPHEN 7.5; 325 MG/1; MG/1
2 TABLET ORAL EVERY 4 HOURS PRN
Status: DISCONTINUED | OUTPATIENT
Start: 2020-02-25 | End: 2020-02-26

## 2020-02-25 RX ORDER — METOCLOPRAMIDE HYDROCHLORIDE 5 MG/ML
10 INJECTION INTRAMUSCULAR; INTRAVENOUS EVERY 6 HOURS PRN
Status: ACTIVE | OUTPATIENT
Start: 2020-02-25 | End: 2020-02-27

## 2020-02-25 RX ORDER — PROCHLORPERAZINE EDISYLATE 5 MG/ML
5 INJECTION INTRAMUSCULAR; INTRAVENOUS ONCE AS NEEDED
Status: DISCONTINUED | OUTPATIENT
Start: 2020-02-25 | End: 2020-02-25 | Stop reason: HOSPADM

## 2020-02-25 RX ORDER — MORPHINE SULFATE 4 MG/ML
4 INJECTION, SOLUTION INTRAMUSCULAR; INTRAVENOUS EVERY 10 MIN PRN
Status: DISCONTINUED | OUTPATIENT
Start: 2020-02-25 | End: 2020-02-25 | Stop reason: HOSPADM

## 2020-02-25 RX ORDER — SODIUM CHLORIDE, SODIUM LACTATE, POTASSIUM CHLORIDE, CALCIUM CHLORIDE 600; 310; 30; 20 MG/100ML; MG/100ML; MG/100ML; MG/100ML
INJECTION, SOLUTION INTRAVENOUS CONTINUOUS
Status: DISCONTINUED | OUTPATIENT
Start: 2020-02-25 | End: 2020-02-28

## 2020-02-25 RX ORDER — TRANEXAMIC ACID 10 MG/ML
1000 INJECTION, SOLUTION INTRAVENOUS ONCE
Status: COMPLETED | OUTPATIENT
Start: 2020-02-25 | End: 2020-02-25

## 2020-02-25 RX ORDER — PROCHLORPERAZINE EDISYLATE 5 MG/ML
10 INJECTION INTRAMUSCULAR; INTRAVENOUS EVERY 6 HOURS PRN
Status: ACTIVE | OUTPATIENT
Start: 2020-02-25 | End: 2020-02-27

## 2020-02-25 RX ORDER — HYDROMORPHONE HYDROCHLORIDE 1 MG/ML
0.2 INJECTION, SOLUTION INTRAMUSCULAR; INTRAVENOUS; SUBCUTANEOUS EVERY 5 MIN PRN
Status: DISCONTINUED | OUTPATIENT
Start: 2020-02-25 | End: 2020-02-25 | Stop reason: HOSPADM

## 2020-02-25 RX ORDER — EPHEDRINE SULFATE 50 MG/ML
INJECTION, SOLUTION INTRAVENOUS AS NEEDED
Status: DISCONTINUED | OUTPATIENT
Start: 2020-02-25 | End: 2020-02-25 | Stop reason: SURG

## 2020-02-25 RX ORDER — MORPHINE SULFATE 2 MG/ML
1 INJECTION, SOLUTION INTRAMUSCULAR; INTRAVENOUS EVERY 2 HOUR PRN
Status: DISCONTINUED | OUTPATIENT
Start: 2020-02-25 | End: 2020-02-28

## 2020-02-25 RX ORDER — DIPHENHYDRAMINE HCL 25 MG
25 CAPSULE ORAL EVERY 4 HOURS PRN
Status: DISCONTINUED | OUTPATIENT
Start: 2020-02-25 | End: 2020-02-28

## 2020-02-25 RX ORDER — MELATONIN
325
Status: DISCONTINUED | OUTPATIENT
Start: 2020-02-26 | End: 2020-02-28

## 2020-02-25 RX ORDER — HYDROMORPHONE HYDROCHLORIDE 1 MG/ML
INJECTION, SOLUTION INTRAMUSCULAR; INTRAVENOUS; SUBCUTANEOUS AS NEEDED
Status: DISCONTINUED | OUTPATIENT
Start: 2020-02-25 | End: 2020-02-25 | Stop reason: SURG

## 2020-02-25 RX ORDER — GABAPENTIN 300 MG/1
300 CAPSULE ORAL NIGHTLY
Status: DISCONTINUED | OUTPATIENT
Start: 2020-02-25 | End: 2020-02-28

## 2020-02-25 RX ORDER — NALOXONE HYDROCHLORIDE 0.4 MG/ML
80 INJECTION, SOLUTION INTRAMUSCULAR; INTRAVENOUS; SUBCUTANEOUS AS NEEDED
Status: DISCONTINUED | OUTPATIENT
Start: 2020-02-25 | End: 2020-02-25 | Stop reason: HOSPADM

## 2020-02-25 RX ORDER — ONDANSETRON 2 MG/ML
4 INJECTION INTRAMUSCULAR; INTRAVENOUS ONCE AS NEEDED
Status: DISCONTINUED | OUTPATIENT
Start: 2020-02-25 | End: 2020-02-25 | Stop reason: HOSPADM

## 2020-02-25 RX ORDER — ESCITALOPRAM OXALATE 20 MG/1
20 TABLET ORAL DAILY
Status: DISCONTINUED | OUTPATIENT
Start: 2020-02-26 | End: 2020-02-28

## 2020-02-25 RX ORDER — DIPHENHYDRAMINE HYDROCHLORIDE 50 MG/ML
12.5 INJECTION INTRAMUSCULAR; INTRAVENOUS EVERY 4 HOURS PRN
Status: DISCONTINUED | OUTPATIENT
Start: 2020-02-25 | End: 2020-02-28

## 2020-02-25 RX ORDER — BUPIVACAINE HYDROCHLORIDE 7.5 MG/ML
INJECTION, SOLUTION INTRASPINAL
Status: COMPLETED | OUTPATIENT
Start: 2020-02-25 | End: 2020-02-25

## 2020-02-25 RX ORDER — ASPIRIN 325 MG
325 TABLET ORAL 2 TIMES DAILY
Status: DISCONTINUED | OUTPATIENT
Start: 2020-02-25 | End: 2020-02-28

## 2020-02-25 RX ORDER — GABAPENTIN 600 MG/1
600 TABLET ORAL ONCE
Status: COMPLETED | OUTPATIENT
Start: 2020-02-25 | End: 2020-02-25

## 2020-02-25 RX ORDER — LIDOCAINE HYDROCHLORIDE 10 MG/ML
INJECTION, SOLUTION EPIDURAL; INFILTRATION; INTRACAUDAL; PERINEURAL AS NEEDED
Status: DISCONTINUED | OUTPATIENT
Start: 2020-02-25 | End: 2020-02-25 | Stop reason: SURG

## 2020-02-25 RX ORDER — BUPIVACAINE HYDROCHLORIDE AND EPINEPHRINE 5; 5 MG/ML; UG/ML
INJECTION, SOLUTION PERINEURAL AS NEEDED
Status: DISCONTINUED | OUTPATIENT
Start: 2020-02-25 | End: 2020-02-25 | Stop reason: HOSPADM

## 2020-02-25 RX ORDER — DIPHENHYDRAMINE HYDROCHLORIDE 50 MG/ML
25 INJECTION INTRAMUSCULAR; INTRAVENOUS ONCE AS NEEDED
Status: ACTIVE | OUTPATIENT
Start: 2020-02-25 | End: 2020-02-25

## 2020-02-25 RX ORDER — HYDROCODONE BITARTRATE AND ACETAMINOPHEN 5; 325 MG/1; MG/1
2 TABLET ORAL AS NEEDED
Status: DISCONTINUED | OUTPATIENT
Start: 2020-02-25 | End: 2020-02-25 | Stop reason: HOSPADM

## 2020-02-25 RX ORDER — ACETAMINOPHEN 500 MG
1000 TABLET ORAL ONCE
Status: COMPLETED | OUTPATIENT
Start: 2020-02-25 | End: 2020-02-25

## 2020-02-25 RX ORDER — FAMOTIDINE 20 MG/1
20 TABLET ORAL ONCE
Status: COMPLETED | OUTPATIENT
Start: 2020-02-25 | End: 2020-02-25

## 2020-02-25 RX ORDER — HYDROCODONE BITARTRATE AND ACETAMINOPHEN 7.5; 325 MG/1; MG/1
1 TABLET ORAL EVERY 4 HOURS PRN
Status: DISCONTINUED | OUTPATIENT
Start: 2020-02-25 | End: 2020-02-26

## 2020-02-25 RX ORDER — DOCUSATE SODIUM 100 MG/1
100 CAPSULE, LIQUID FILLED ORAL 2 TIMES DAILY
Status: DISCONTINUED | OUTPATIENT
Start: 2020-02-25 | End: 2020-02-28

## 2020-02-25 RX ADMIN — HYDROMORPHONE HYDROCHLORIDE 0.5 MG: 1 INJECTION, SOLUTION INTRAMUSCULAR; INTRAVENOUS; SUBCUTANEOUS at 11:51:00

## 2020-02-25 RX ADMIN — PHENYLEPHRINE HCL 100 MCG: 10 MG/ML VIAL (ML) INJECTION at 08:34:00

## 2020-02-25 RX ADMIN — SODIUM CHLORIDE, SODIUM LACTATE, POTASSIUM CHLORIDE, CALCIUM CHLORIDE: 600; 310; 30; 20 INJECTION, SOLUTION INTRAVENOUS at 07:56:00

## 2020-02-25 RX ADMIN — EPHEDRINE SULFATE 10 MG: 50 INJECTION, SOLUTION INTRAVENOUS at 08:49:00

## 2020-02-25 RX ADMIN — TRANEXAMIC ACID 1000 MG: 10 INJECTION, SOLUTION INTRAVENOUS at 08:24:00

## 2020-02-25 RX ADMIN — CEFAZOLIN SODIUM/WATER 2 G: 2 G/20 ML SYRINGE (ML) INTRAVENOUS at 08:12:00

## 2020-02-25 RX ADMIN — BUPIVACAINE HYDROCHLORIDE 1.7 ML: 7.5 INJECTION, SOLUTION INTRASPINAL at 08:07:00

## 2020-02-25 RX ADMIN — PHENYLEPHRINE HCL 100 MCG: 10 MG/ML VIAL (ML) INJECTION at 09:22:00

## 2020-02-25 RX ADMIN — LIDOCAINE HYDROCHLORIDE 10 MG: 10 INJECTION, SOLUTION EPIDURAL; INFILTRATION; INTRACAUDAL; PERINEURAL at 08:10:00

## 2020-02-25 RX ADMIN — MIDAZOLAM HYDROCHLORIDE 2 MG: 1 INJECTION INTRAMUSCULAR; INTRAVENOUS at 07:59:00

## 2020-02-25 RX ADMIN — VANCOMYCIN HYDROCHLORIDE 1.5 G: at 07:51:00

## 2020-02-25 RX ADMIN — PHENYLEPHRINE HCL 100 MCG: 10 MG/ML VIAL (ML) INJECTION at 09:05:00

## 2020-02-25 NOTE — PHYSICAL THERAPY NOTE
PHYSICAL THERAPY KNEE EVALUATION - INPATIENT       Room Number: 404/404-A  Evaluation Date: 2/25/2020  Type of Evaluation: Initial  Physician Order: PT Eval and Treat    Presenting Problem: R TKA revision  Reason for Therapy: Mobility Dysfunction and Disch is a patient of Dr. Gay oDn who referred to me for possible right knee revision. She has Rheumatoid Arthritis. She had her right knee replaced 12 or 13 years ago by Dr. Linette Mckeon. He's done well.  Three weeks ago she work up with severe pain and couldn't wa WFL - within functional limits    RANGE OF MOTION AND STRENGTH ASSESSMENT  Upper extremity ROM and strength: SEE OT    Lower extremity ROM is within functional limits except for the following:  R knee s/p R TKA    Lower extremity strength is within functio 2/29/20  Patient Goal Patient's self-stated goal is:    Goal #1 Patient is able to demonstrate supine - sit EOB @ level: modified independent     Goal #1   Current Status    Goal #2 Patient is able to demonstrate transfers Sit to/from Stand at assistance l

## 2020-02-25 NOTE — OPERATIVE REPORT
[facility]    PATIENT'S NAME: Isaiah Luna   ATTENDING PHYSICIAN: Akila Garcia MD   OPERATING PHYSICIAN: Eben Allen MD   PATIENT ACCOUNT#:   [id number]    LOCATION:  [address] [place] [address] RECORD #:   [id number]       DATE prophylactically. After a time-out, the extremity was exsanguinated with an Esmarch bandage, tourniquet inflated to 300 with the knee fully flexed. The old incision was used and extended slightly proximally and distally.   A medial parapatellar arthrotomy side and 4 mm medially. On the final femoral assembly, the augments were 10 lateral posterior, 5 medial posterior, and 12 distal laterally, and 4 medially distally with an 18 stem and a 3-degree offset.   The actual implant was assembled, and the bone that

## 2020-02-25 NOTE — ANESTHESIA POSTPROCEDURE EVALUATION
Patient: Carolyne Dawson    Procedure Summary     Date:  02/25/20 Room / Location:  [address] [place] [address] MAIN OR 12 / [address] [place] [address] MAIN OR    Anesthesia Start:  1597 Anesthesia Stop:  1200    Procedure:  KNEE TOTAL REVISION (Right Knee) Diagnosis:  (failed right total knee a

## 2020-02-25 NOTE — PROGRESS NOTES
[facility] HOSP [facility]    Progress Note    Jose Grief Patient Status:  Surgery Admit - Inpt    3/15/1975 MRN [id number]   Location [address] UNIT Attending Nena Mukherjee MD   Hosp Day # 0 [facility]CP [facility]  Ryan Avendano.                Results:     Lab Results   Component Value Date    WBC 11.8 (H) 12/05/2019    HGB 13.7 12/05/2019    HCT 42.8 12/05/2019    .0 (H) 12/05/2019    CREATSERUM 0.81 02/02/2019    BUN 14 02/02/2019

## 2020-02-25 NOTE — BRIEF OP NOTE
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     Surgeon(s) and Chelsey

## 2020-02-25 NOTE — PROGRESS NOTES
Pharmacy note: Duplicate Proton Pump Inhibitor with Histamine 2 blocker. Carolyne Dawson is a 53year old female who has been prescribed both Famotidine and Protonix.   Pepcid was discontinued per P&T approved protocol for duplicate therapy in lotus

## 2020-02-25 NOTE — INTERVAL H&P NOTE
Pre-op Diagnosis: failed right total knee arthroplasty    The above referenced H&P was reviewed by Lonia Sicard, MD on 2/25/2020, the patient was examined and no significant changes have occurred in the patient's condition since the H&P was perfor

## 2020-02-25 NOTE — ANESTHESIA PREPROCEDURE EVALUATION
Anesthesia PreOp Note    HPI:     Jose Sanchez is a 53year old female who presents for preoperative consultation requested by: Nghia Fallon MD    Date of Surgery: 2/25/2020    Procedure(s):  KNEE TOTAL REVISION  Indication: failed rig Tofacitinib Citrate (XELJANZ) 10 MG Oral Tab, Take 11 mg by mouth daily. , Disp: , Rfl: , 2/18/2020  Meloxicam (MOBIC) 7.5 MG Oral Tab, Take 15 mg by mouth daily. , Disp: , Rfl: , 2/18/2020  escitalopram 20 MG Oral Tab, Take 20 mg by mouth daily. , Disp: Substance and Sexual Activity      Alcohol use: Yes        Comment: drinks socially-once every month      Drug use: Yes        Frequency: 4.0 times per week        Types: Cannabis        Comment: medical marijuana      Sexual activity: Not on file    Lif TM distance: >3 FB  Neck ROM: full  Dental          Pulmonary - negative ROS and normal exam   Cardiovascular - negative ROS and normal exam    Neuro/Psych    (+)  seizures, anxiety/panic attacks,  bipolar disorder, depression,       Comments: Seizures 1 y

## 2020-02-26 ENCOUNTER — TELEPHONE (OUTPATIENT)
Dept: ORTHOPEDICS CLINIC | Facility: CLINIC | Age: 45
End: 2020-02-26

## 2020-02-26 LAB
DEPRECATED RDW RBC AUTO: 49 FL (ref 35.1–46.3)
ERYTHROCYTE [DISTWIDTH] IN BLOOD BY AUTOMATED COUNT: 14.3 % (ref 11–15)
HCT VFR BLD AUTO: 34.3 % (ref 35–48)
HGB BLD-MCNC: 11.5 G/DL (ref 12–16)
MCH RBC QN AUTO: 31.5 PG (ref 26–34)
MCHC RBC AUTO-ENTMCNC: 33.5 G/DL (ref 31–37)
MCV RBC AUTO: 94 FL (ref 80–100)
PLATELET # BLD AUTO: 346 10(3)UL (ref 150–450)
RBC # BLD AUTO: 3.65 X10(6)UL (ref 3.8–5.3)
WBC # BLD AUTO: 13.5 X10(3) UL (ref 4–11)

## 2020-02-26 PROCEDURE — 99233 SBSQ HOSP IP/OBS HIGH 50: CPT | Performed by: HOSPITALIST

## 2020-02-26 RX ORDER — HYDROCODONE BITARTRATE AND ACETAMINOPHEN 10; 325 MG/1; MG/1
2 TABLET ORAL EVERY 4 HOURS PRN
Status: DISCONTINUED | OUTPATIENT
Start: 2020-02-26 | End: 2020-02-28

## 2020-02-26 RX ORDER — ACETAMINOPHEN 325 MG/1
650 TABLET ORAL EVERY 4 HOURS PRN
Status: DISCONTINUED | OUTPATIENT
Start: 2020-02-26 | End: 2020-02-28

## 2020-02-26 RX ORDER — HYDROCODONE BITARTRATE AND ACETAMINOPHEN 10; 325 MG/1; MG/1
1 TABLET ORAL EVERY 4 HOURS PRN
Status: DISCONTINUED | OUTPATIENT
Start: 2020-02-26 | End: 2020-02-28

## 2020-02-26 NOTE — PHYSICAL THERAPY NOTE
PHYSICAL THERAPY KNEE TREATMENT NOTE - INPATIENT     Room Number: 923/327-C             Presenting Problem: R TKA revision    Problem List  Principal Problem:    Failed total knee arthroplasty, initial encounter [facility])  Active Problems:    Rheumatoid arthri (including adjusting bedclothes, sheets and blankets)?: A Little   -   Sitting down on and standing up from a chair with arms (e.g., wheelchair, bedside commode, etc.): A Little   -   Moving from lying on back to sitting on the side of the bed?: A Little Patient is able to ambulate 300 feet with assistive device at assistance level: modified independent    Goal #3   Current Status CGA 50 ft and 100 ft with RW with anatlgic gait   Goal #4 Patient will negotiate 1 flight of stairs/one curb w/ assistive devic

## 2020-02-26 NOTE — BH PROGRESS NOTE
Psych Liaison attempted to see Yamil Kyle for PHQ-9 consultation at which time she asked that I come back later since she had a visitor present. Psych Liaison communicated attempted consultation with [facility] BIBI Ramsey.      Psych Liaison will attempt to see her aga Put bactroban in nose twice daily for 5 days    Folliculitis  Folliculitis is an inflammation of a hair follicle. A hair follicle is the little pocket where a hair grows out of the skin. Bacteria normally live on the skin. But sometimes bacteria can get trapped in a follicle and cause infection. This causes a bumpy rash. The area over the follicles is red and raised. It may itch or be painful. The bumps may have fluid (pus) inside. The pus may leak and then form crusts. Sores can spread to other areas of the body. Once it goes away, folliculitis can come back at any time. Severe cases may cause permanent hair loss and scarring.  Folliculitis can happen anywhere on the body where hair grows. It can be caused by rubbing from tight clothing. Ingrown hairs can cause it. Soaking in a hot tub or swimming pool that has bacteria in the water can cause it. It may also occur if a hair follicle is blocked by a bandage.  Sores often go away in a few days with no treatment. In some cases, medicine may be given. A small piece of skin or pus may be taken to find the type of bacteria causing the infection.  Home care  The healthcare provider may prescribe an antibiotic cream or ointment.  Oral antibiotics may also be prescribed. Or you may be told to use an over-the-counter antibiotic cream. Follow all instructions when using any of these medicines.  General care:  · Apply warm, moist compresses to the sores for 20 minutes up to 3 times a day. You can make a compress by soaking a cloth in warm water. Squeeze out excess water.  · Dont cut, poke, or squeeze the sores. This can be painful and spread infection.  · Dont scratch the affected area. Scratching can delay healing.  · Dont shave the areas affected by folliculitis.  · If the sores leak fluid, cover the area with a nonstick gauze bandage. Use as little tape as possible. Carefully discard all soiled bandages.  · Dress in loose cotton clothing.  · Change sheets and blankets if  they are soiled by pus. Wash all clothes, towels, sheets, and cloth diapers in soap and hot water. Do not share clothes, towels, or sheets with other family members.  · Do not soak the sores in bath water. This can spread infection. Instead, keep the area clean by gently washing sores with soap and warm water.  · Wash your hands or use antibacterial gels often to prevent spreading the bacteria.  Follow-up care  Follow up with your healthcare provider, or as advised.  When to seek medical advice  Call your healthcare provider right away if any of these occur:  · Fever of 100.4°F (38°C) or higher  · Spreading of the rash  · Rash does not get better with treatment  · Redness or swelling that gets worse  · Rash becomes more painful  · Foul-smelling fluid leaking from the skin  · Rash improves, but then comes back   Date Last Reviewed: 11/1/2016  © 1497-9552 [employer]. [address] [place] [address], [place] [address]. All rights reserved. This information is not intended as a substitute for professional medical care. Always follow your healthcare professional's instructions.

## 2020-02-26 NOTE — PROGRESS NOTES
[address] Patient Status:  Inpatient    3/15/1975 MRN [id number]   Location [facility] 4W/SW/SE Attending Sandro Lim MD   Hosp Day # 1 PCP [facility]     Subjective:  Post-Operative Day: 1 Status Post

## 2020-02-26 NOTE — PAYOR COMM NOTE
--------------  CONTINUED STAY REVIEW    Payor: Laurynellen Marquez LABOR [employer] PPO  Subscriber #:  [id number]  Authorization Number: [id number]    Admit date: 2/25/20  Admit time: [address] [facility]    Admitting Physician: Gris Lim MD  Attending Physician:  Paul Gannon Arlington Apgar, RN      HYDROcodone-acetaminophen [facility]) 7.5-325 MG per tab 2 tablet     Date Action Dose Route User    2020 9077 Given 2 tablet Oral Arlington Apgar, RN    2020 6705 Given 2 tablet Oral cici Alvarado    2020 Given 2 tablet

## 2020-02-26 NOTE — PLAN OF CARE
Patient alert and oriented. Vitals stable. A little [place]. Room air. SCD's and ASA. Gonzalez in place - to be removed this morning. Up with 1 assist + walker. Polar care as needed. Dressing C/D/I. CPM applied - patient tolerated well.  Patient struggling with pa period  Description  INTERVENTIONS  - Monitor WBC  - Administer growth factors as ordered  - Implement neutropenic guidelines  Outcome: Progressing     Problem: SAFETY ADULT - FALL  Goal: Free from fall injury  Description  INTERVENTIONS:  - Assess pt freq

## 2020-02-26 NOTE — OCCUPATIONAL THERAPY NOTE
OCCUPATIONAL THERAPY EVALUATION - INPATIENT      Room Number: 404/404-A  Evaluation Date: 2/26/2020  Type of Evaluation: Initial       Physician Order: IP Consult to Occupational Therapy  Reason for Therapy: ADL/IADL Dysfunction and Discharge Planning    O OCCUPATIONAL THERAPY MEDICAL/SOCIAL HISTORY     Problem List   Principal Problem:    Failed total knee arthroplasty, initial encounter [facility])  Active Problems:    Rheumatoid arthritis ([address]    Failed total knee arthroplasty [facility])      Past Medical Histo Activity promotion;Repositioning    ACTIVITY TOLERANCE  Pulse: 74                      O2 SATURATIONS  SPO2 on Room Air at Rest: 99             COGNITION  Overall Cognitive Status:  WFL - within functional limits    RANGE OF MOTION   Upper extremity ROM is

## 2020-02-26 NOTE — PROGRESS NOTES
[facility]    Progress Note    Keara Crawley Patient Status:  Inpatient    3/15/1975 MRN [id number]   Location [facility] 4W/SW/SE Attending Roosvelt Habermann, MD   Hosp Day # 1 PCP [facility]       Subjective:    Jacinta Mccarthy ALPRAZolam, Normal Saline Flush, sodium chloride 0.9%, PEG 3350, magnesium hydroxide, bisacodyl, Fleet Enema, ondansetron HCl, Metoclopramide HCl, Prochlorperazine Edisylate, diphenhydrAMINE **OR** diphenhydrAMINE HCl, morphINE sulfate **OR** morphINE sulf spent counseling re: treatment plan and workup    Bharati Robles MD  [facility]       **Certification      PHYSICIAN Certification of Need for Inpatient Hospitalization - Initial Certification    Patient will require inpatient services

## 2020-02-26 NOTE — CM/SW NOTE
11: 07AM SW received MDO for s/p joint. SW met with pt to complete an initial assessment. SW confirmed pt's address and phone number with the pt. Pt states she will be staying in a friends house at discharge.      The address which the pt will be dischar

## 2020-02-27 LAB
DEPRECATED RDW RBC AUTO: 49.1 FL (ref 35.1–46.3)
ERYTHROCYTE [DISTWIDTH] IN BLOOD BY AUTOMATED COUNT: 14.4 % (ref 11–15)
HCT VFR BLD AUTO: 35.8 % (ref 35–48)
HGB BLD-MCNC: 11.6 G/DL (ref 12–16)
MCH RBC QN AUTO: 30.5 PG (ref 26–34)
MCHC RBC AUTO-ENTMCNC: 32.4 G/DL (ref 31–37)
MCV RBC AUTO: 94.2 FL (ref 80–100)
PLATELET # BLD AUTO: 370 10(3)UL (ref 150–450)
RBC # BLD AUTO: 3.8 X10(6)UL (ref 3.8–5.3)
WBC # BLD AUTO: 12.8 X10(3) UL (ref 4–11)

## 2020-02-27 PROCEDURE — 99232 SBSQ HOSP IP/OBS MODERATE 35: CPT | Performed by: HOSPITALIST

## 2020-02-27 RX ORDER — PSEUDOEPHEDRINE HCL 30 MG
100 TABLET ORAL 2 TIMES DAILY
Qty: 20 CAPSULE | Refills: 0 | Status: SHIPPED | OUTPATIENT
Start: 2020-02-27

## 2020-02-27 RX ORDER — ASPIRIN 325 MG
325 TABLET ORAL 2 TIMES DAILY
Qty: 60 TABLET | Refills: 0 | Status: SHIPPED | OUTPATIENT
Start: 2020-02-27 | End: 2021-11-22

## 2020-02-27 RX ORDER — GABAPENTIN 300 MG/1
300 CAPSULE ORAL NIGHTLY
Qty: 14 CAPSULE | Refills: 0 | Status: SHIPPED | OUTPATIENT
Start: 2020-02-27 | End: 2021-11-18

## 2020-02-27 RX ORDER — MELATONIN
325
Qty: 20 TABLET | Refills: 0 | Status: SHIPPED | OUTPATIENT
Start: 2020-02-28 | End: 2021-11-18

## 2020-02-27 RX ORDER — HYDROCODONE BITARTRATE AND ACETAMINOPHEN 10; 325 MG/1; MG/1
1 TABLET ORAL EVERY 4 HOURS PRN
Qty: 50 TABLET | Refills: 0 | Status: SHIPPED | OUTPATIENT
Start: 2020-02-27 | End: 2021-11-18

## 2020-02-27 NOTE — PHYSICAL THERAPY NOTE
PHYSICAL THERAPY KNEE TREATMENT NOTE - INPATIENT     Room Number: 877/747-Q             Presenting Problem: R TKA revision    Problem List  Principal Problem:    Failed total knee arthroplasty, initial encounter [facility])  Active Problems:    Rheumatoid arthri down on and standing up from a chair with arms (e.g., wheelchair, bedside commode, etc.): A Little   -   Moving from lying on back to sitting on the side of the bed?: A Little   How much help from another person does the patient currently need. ..   -   Mov independent    Goal #3   Current Status CGA 50 ft and 100 ft with RW with anatlgic gait   Goal #4 Patient will negotiate 1 flight of stairs/one curb w/ assistive device and supervision   Goal #4   Current Status 4 stairs x 3 with 2 and 1 HR Min A   Goal #5

## 2020-02-27 NOTE — PLAN OF CARE
Pt A&O.  VSS. CMS (+). RA.  IS.  ASA/SCD. Gas (+). Up w/ walker X 1. Pt voiding. Pain managed w/ norco w/ 2 doses of morphine for breakthrough. Tolerating diet well w/ no N&V. Saline Locked.         Problem: Patient Centered Care  Goal: Patient pref physical deficits and behaviors that affect risk of falls.   - [place] fall precautions as indicated by assessment.  - Educate pt/family on patient safety including physical limitations  - Instruct pt to call for assistance with activity based on assessme

## 2020-02-27 NOTE — PROGRESS NOTES
[facility]    Progress Note    Bernard Weiss Patient Status:  Inpatient    3/15/1975 MRN [id number]   Location [facility] 4W/SW/SE Attending Baldomero Rios MD   Hosp Day # 2 PCP [facility]       Subjective:    Rafaela Larkinus Slim Garland PA-C  2/27/2020

## 2020-02-27 NOTE — PROGRESS NOTES
[facility]    Progress Note    Annika Virgen Patient Status:  Inpatient    3/15/1975 MRN [id number]   Location [facility] 4W/SW/SE Attending Marilia Fortune MD   Hosp Day # 2 PCP [facility]       Subjective:    Marylee Caras HYDROcodone-acetaminophen, ALPRAZolam, Normal Saline Flush, PEG 3350, magnesium hydroxide, bisacodyl, Fleet Enema, diphenhydrAMINE **OR** diphenhydrAMINE HCl, morphINE sulfate **OR** morphINE sulfate **OR** morphINE sulfate    Results:     Recent Labs   La BLOCK, PAIN CONTROL, NEURO VASCULAR CHECKS, ASA DVT PROPHYLAXIS, PT/OT.         Rheumatoid arthritis ([facility] [address]  Donato Villalba.          Greater than 35 minutes spent, >50% spent counseling re: treatment plan and workup    Hermelinda Hernández MD  [facility]

## 2020-02-27 NOTE — PLAN OF CARE
Patient alert and oriented. Vitals stable. CPM applied this shift - tolerated well. Polar care as needed. PRN Norco for pain control - did have to give Morphine twice for breakthrough pain but patient has since been okay.  Patient reports concerns with gloria guidelines  Outcome: Progressing     Problem: SAFETY ADULT - FALL  Goal: Free from fall injury  Description  INTERVENTIONS:  - Assess pt frequently for physical needs  - Identify cognitive and physical deficits and behaviors that affect risk of falls.   - I

## 2020-02-28 VITALS
RESPIRATION RATE: 18 BRPM | SYSTOLIC BLOOD PRESSURE: 132 MMHG | OXYGEN SATURATION: 100 % | HEIGHT: 67 IN | DIASTOLIC BLOOD PRESSURE: 85 MMHG | HEART RATE: 91 BPM | WEIGHT: 204 LBS | TEMPERATURE: 98 F | BODY MASS INDEX: 32.02 KG/M2

## 2020-02-28 LAB
DEPRECATED RDW RBC AUTO: 49.1 FL (ref 35.1–46.3)
ERYTHROCYTE [DISTWIDTH] IN BLOOD BY AUTOMATED COUNT: 14.4 % (ref 11–15)
HCT VFR BLD AUTO: 31.8 % (ref 35–48)
HGB BLD-MCNC: 10.5 G/DL (ref 12–16)
MCH RBC QN AUTO: 30.9 PG (ref 26–34)
MCHC RBC AUTO-ENTMCNC: 33 G/DL (ref 31–37)
MCV RBC AUTO: 93.5 FL (ref 80–100)
PLATELET # BLD AUTO: 367 10(3)UL (ref 150–450)
RBC # BLD AUTO: 3.4 X10(6)UL (ref 3.8–5.3)
WBC # BLD AUTO: 11.8 X10(3) UL (ref 4–11)

## 2020-02-28 PROCEDURE — 99239 HOSP IP/OBS DSCHRG MGMT >30: CPT | Performed by: HOSPITALIST

## 2020-02-28 NOTE — OCCUPATIONAL THERAPY NOTE
OCCUPATIONAL THERAPY TREATMENT NOTE - INPATIENT    Room Number: 404/404-A               Problem List  Principal Problem:    Failed total knee arthroplasty, initial encounter [facility])  Active Problems:    Rheumatoid arthritis ([address]    Failed total knee arthrop Dressing: setup/sba    Education Provided: safety, role of therapy, poc    Patient End of Session: Needs met; In bed;Call light within reach;RN aware of session/findings; All patient questions and concerns addressed    OT Goals:      OT Goals  Patient self-s

## 2020-02-28 NOTE — PHYSICAL THERAPY NOTE
PHYSICAL THERAPY KNEE TREATMENT NOTE - INPATIENT     Room Number: 330/536-P             Presenting Problem: R TKA revision    Problem List  Principal Problem:    Failed total knee arthroplasty, initial encounter [facility])  Active Problems:    Rheumatoid arthri Sitting down on and standing up from a chair with arms (e.g., wheelchair, bedside commode, etc.): A Little   -   Moving from lying on back to sitting on the side of the bed?: A Little   How much help from another person does the patient currently need. Sherice Singh independent    Goal #3   Current Status  ft with RW with anatlgic gait   Goal #4 Patient will negotiate 1 flight of stairs/one curb w/ assistive device and supervision   Goal #4   Current Status 10 stairs with 1 HR Min A   Goal #5  AROM 0 degrees ex

## 2020-02-28 NOTE — PLAN OF CARE
Up with walker with assist, slooooww gait with much pain- MDS aware and medicated per MD orders for pain. DC plan is to home tomorrow with [facility].    Problem: Patient Centered Care  Goal: Patient preferences are identified and integrated in the patient's plan affect risk of falls.   - [place] fall precautions as indicated by assessment.  - Educate pt/family on patient safety including physical limitations  - Instruct pt to call for assistance with activity based on assessment  - Modify environment to reduce ri

## 2020-02-28 NOTE — PAYOR COMM NOTE
--------------  CONTINUED STAY REVIEW  2-28-20       Payor: [employer] PPO  Subscriber #:  [id number]  Authorization Number: [id number]    Admit date: 2/25/20  Admit time: [address] [facility]    Admitting Physician: Mar Barba MD  Attending Physici Tyra Hdz RN    2/27/2020 1943 Given 2 tablet Oral Noel [facility]    2/27/2020 1533 Given 2 tablet Oral Norma DURAN RN      Pantoprazole Sodium (PROTONIX) EC tab 20 mg     Date Action Dose Route User    2/28/2020 1533 Given 20 mg Oral 286 [address] [place] [address]

## 2020-02-28 NOTE — PLAN OF CARE
VSS, no acute events overnight. Pt resting in bed, pain controlled with Norco 10mg x2 Q4H PRN. CPM to bedside overnight at 0-50 for 2 hours. Polar ice in place. Pt plans to be discharged home with home health care. Disease process discussed with pt.  Bed in factors as ordered  - Implement neutropenic guidelines  Outcome: Progressing     Problem: SAFETY ADULT - FALL  Goal: Free from fall injury  Description  INTERVENTIONS:  - Assess pt frequently for physical needs  - Identify cognitive and physical deficits a

## 2020-03-03 NOTE — PAYOR COMM NOTE
REF: [id number]- FAXING CLINICAL UPDATE FOR 2/27/20-2/28/20 2/27/20  Subjective:    Rajesh Lawrence is sitting up in bed  Pain is still an issue for her    Objective:        02/26/20  1548 02/26/20  2120 02/27/20  0349 02/27/20  0424   BP: 94/54 11   Rheumatoid arthritis ([facility] [address]  HOLD XELJANZ    Medications        02/26 02/27 02/28                                          Or  HYDROcodone-acetaminophen (NORCO)  MG per tab 2 tablet   Dose: 2 tablet  Freq: Every 4 hours PRN Route: OR  PRN Reason: se ferrous sulfate EC tab 325 mg   Dose: 325 mg  Freq: Daily with breakfast Route: OR  Start: 02/26/20 0800 End: 02/28/20 1646    Admin Instructions:   Do not crush           [id number]      [id number]      [id number]

## 2020-03-25 NOTE — DISCHARGE SUMMARY
[facility]    Discharge Summary    Levi Floyd Patient Status:  Inpatient    3/15/1975 MRN [id number]   Location [facility] 4W/SW/SE Attending No att. providers found   Hosp Day # 3 PCP Joselyn Hernandes     Date of Adm FOLLOW UP WITH PCP         LABS :     Lab Results   Component Value Date    WBC 11.8 (H) 02/28/2020    HGB 10.5 (L) 02/28/2020    HCT 31.8 (L) 02/28/2020    .0 02/28/2020    CREATSERUM 0.81 02/02/2019    BUN 14 02/02/2019     02/02/2019    K 4 and this prescription was added. Make sure you understand how and when to take each. Take 1 tablet by mouth every 4 (four) hours as needed.    Quantity:  50 tablet  Refills:  0        CONTINUE taking these medications      Instructions Prescription det contact you or your family to schedule your first visit.            ----------------------------------------------------  >30 MIN SPENT ON THIS DC   ANGÉLICA HERRERA  3/25/2020  7:21 AM

## 2021-08-03 ENCOUNTER — OFFICE VISIT (OUTPATIENT)
Dept: OTOLARYNGOLOGY | Facility: CLINIC | Age: 46
End: 2021-08-03
Payer: COMMERCIAL

## 2021-08-03 VITALS — HEIGHT: 67 IN | WEIGHT: 204 LBS | BODY MASS INDEX: 32.02 KG/M2

## 2021-08-03 DIAGNOSIS — J01.91 ACUTE RECURRENT SINUSITIS, UNSPECIFIED LOCATION: ICD-10-CM

## 2021-08-03 DIAGNOSIS — H92.11 OTORRHEA OF RIGHT EAR: Primary | ICD-10-CM

## 2021-08-03 PROCEDURE — 99214 OFFICE O/P EST MOD 30 MIN: CPT | Performed by: SPECIALIST

## 2021-08-03 PROCEDURE — 92511 NASOPHARYNGOSCOPY: CPT | Performed by: SPECIALIST

## 2021-08-03 PROCEDURE — 3008F BODY MASS INDEX DOCD: CPT | Performed by: SPECIALIST

## 2021-08-03 RX ORDER — OFLOXACIN 3 MG/ML
5 SOLUTION AURICULAR (OTIC) 2 TIMES DAILY
Qty: 10 ML | Refills: 1 | Status: SHIPPED | OUTPATIENT
Start: 2021-08-03 | End: 2021-08-13

## 2021-08-03 RX ORDER — AZITHROMYCIN 250 MG/1
TABLET, FILM COATED ORAL
Qty: 6 TABLET | Refills: 1 | Status: SHIPPED | OUTPATIENT
Start: 2021-08-03 | End: 2021-11-18 | Stop reason: ALTCHOICE

## 2021-08-03 NOTE — PROGRESS NOTES
aZchery Oconnor is a 48year old female.  Patient presents with:  Ear Problem: ear perforation right side, pt c/o of driange and pain , throbbing , per pt had injury to ear as child , pt using OTC tylenol and ibuprofen for pain, and bendryl for itchi and vomiting)    • Rheumatoid arthritis ([facility] [address]    • Seizure disorder [facility])     seizure x1 2/2018      Social History:  Social History    Tobacco Use      Smoking status: Current Every Day Smoker        Packs/day: 0.50        Years: 30.00        Pack years: fiberoptic scope   Larynx Normal by fiberoptic scope     ASSESSMENT AND PLAN:   1. Otorrhea of right ear  Pulsating and clear. Consider chronic mastoiditis. Consider CSF otorrhea  Placed on floxin drops as well as zithromax.   Reviewed exam from 3/5/19 an

## 2021-08-03 NOTE — PATIENT INSTRUCTIONS
Keep right ear dry  Prescriptions floxin and zithromax (repeat x 1 if needed)  Collect fluid from the right ear.   Will check for beta 2 transferrin  Follow up in 3 weeks to recheck

## 2021-08-04 ENCOUNTER — TELEPHONE (OUTPATIENT)
Dept: OTOLARYNGOLOGY | Facility: CLINIC | Age: 46
End: 2021-08-04

## 2021-08-04 NOTE — TELEPHONE ENCOUNTER
Left message for the patient. Grew staph aureus. Sensitivities pending. Continue floxin drops and zithromax for now.

## 2021-08-05 ENCOUNTER — TELEPHONE (OUTPATIENT)
Dept: OTOLARYNGOLOGY | Facility: CLINIC | Age: 46
End: 2021-08-05

## 2021-08-05 NOTE — TELEPHONE ENCOUNTER
Left a message staph both sensitive to the zithromax and floxin.   Continue both and follow up in 3 weeks from 8/3/21 to check

## 2021-08-24 ENCOUNTER — OFFICE VISIT (OUTPATIENT)
Dept: OTOLARYNGOLOGY | Facility: CLINIC | Age: 46
End: 2021-08-24
Payer: COMMERCIAL

## 2021-08-24 VITALS — BODY MASS INDEX: 32.02 KG/M2 | WEIGHT: 204 LBS | HEIGHT: 67 IN

## 2021-08-24 DIAGNOSIS — J01.91 ACUTE RECURRENT SINUSITIS, UNSPECIFIED LOCATION: ICD-10-CM

## 2021-08-24 DIAGNOSIS — H92.11 OTORRHEA OF RIGHT EAR: Primary | ICD-10-CM

## 2021-08-24 DIAGNOSIS — J35.2 ENLARGED ADENOIDS: ICD-10-CM

## 2021-08-24 PROCEDURE — 99213 OFFICE O/P EST LOW 20 MIN: CPT | Performed by: SPECIALIST

## 2021-08-24 PROCEDURE — 31231 NASAL ENDOSCOPY DX: CPT | Performed by: SPECIALIST

## 2021-08-24 PROCEDURE — 3008F BODY MASS INDEX DOCD: CPT | Performed by: SPECIALIST

## 2021-08-24 NOTE — PROGRESS NOTES
Jose Sanchez is a 48year old female. Patient presents with:  Ear Problem: per pt no more drainage, per pt still feels she has fluid,     HPI:   Right otorrhea now stopped. Patient reports that this problem is yearly.     Current Outpatient Medic disorder [facility])     seizure x1 2/2018      Social History:  Social History    Tobacco Use      Smoking status: Current Every Day Smoker        Packs/day: 0.50        Years: 30.00        Pack years: 15      Smokeless tobacco: Never Used    Vaping Use      Va Normal.   Lymph Detail Submental. Submandibular. Anterior cervical. Posterior cervical. Supraclavicular all without enlargement   Psychiatric Orientation - Oriented to time, place, person & situation. Appropriate mood and affect.    Nasopharynx  still very

## 2021-08-24 NOTE — PATIENT INSTRUCTIONS
We will repeat Floxin drops for an additional 10 days time. Follow-up in 3 weeks time. We will consider adenoidectomy as chronic ear condition can be secondary to enlarged and recurrently infected adenoid pad.

## 2021-09-23 ENCOUNTER — OFFICE VISIT (OUTPATIENT)
Dept: OTOLARYNGOLOGY | Facility: CLINIC | Age: 46
End: 2021-09-23
Payer: COMMERCIAL

## 2021-09-23 VITALS — BODY MASS INDEX: 32.02 KG/M2 | HEIGHT: 67 IN | WEIGHT: 204 LBS

## 2021-09-23 DIAGNOSIS — J35.2 ENLARGED ADENOIDS: ICD-10-CM

## 2021-09-23 DIAGNOSIS — H92.11 OTORRHEA OF RIGHT EAR: Primary | ICD-10-CM

## 2021-09-23 PROCEDURE — 99213 OFFICE O/P EST LOW 20 MIN: CPT | Performed by: SPECIALIST

## 2021-09-23 PROCEDURE — 3008F BODY MASS INDEX DOCD: CPT | Performed by: SPECIALIST

## 2021-09-23 NOTE — PROGRESS NOTES
Macey Jaquez is a 48year old female. Patient presents with:   Follow - Up: pt is here today for a follow up on her right ear infection, she states it does not bother her as much but she does feel it pulsating but she does feel the infection has c vomiting)    • Rheumatoid arthritis ([facility] [address]    • Seizure disorder [facility])     seizure x1 2/2018      Social History:  Social History    Tobacco Use      Smoking status: Current Every Day Smoker        Packs/day: 0.50        Years: 30.00        Pack years: 13 through XII grossly intact. ASSESSMENT AND PLAN:   1. Otorrhea of right ear  Now stopped, however granulation tissue around the right PE tube. Continue Floxin drops. We will plan on an audiogram and removing the right PE tube in the operating room.

## 2021-09-23 NOTE — PATIENT INSTRUCTIONS
There is some granulation tissue around the right PE tube. Finish the Floxin drops in the right ear. Make an appointment at [facility] so that an audiogram can be performed. We will plan on an adenoidectomy and removal of the right PE tube.

## 2021-09-24 ENCOUNTER — TELEPHONE (OUTPATIENT)
Dept: OTOLARYNGOLOGY | Facility: CLINIC | Age: 46
End: 2021-09-24

## 2021-10-06 ENCOUNTER — TELEPHONE (OUTPATIENT)
Dept: OTOLARYNGOLOGY | Facility: CLINIC | Age: 46
End: 2021-10-06

## 2021-10-06 NOTE — TELEPHONE ENCOUNTER
Pt states she is still ,moving forward with all the referrals that MD told her to do asking what is needed after she gets the results from gottileb please advise

## 2021-10-07 NOTE — TELEPHONE ENCOUNTER
Dr Hilary Sue per patient wanted to give you an update that she was able to do  Tele visit with Dermatologist and will have a biopsy.

## 2021-10-07 NOTE — TELEPHONE ENCOUNTER
Great.  If she is cleared from this standpoint we were considering getting an audiogram, removing her PE tube and removing her adenoid pad.

## 2021-10-30 ENCOUNTER — TELEPHONE (OUTPATIENT)
Dept: OTOLARYNGOLOGY | Facility: CLINIC | Age: 46
End: 2021-10-30

## 2021-10-30 NOTE — TELEPHONE ENCOUNTER
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 Okay to proceed with adenoidectomy and removal of PE tube. Patient to call to schedule.

## 2021-10-30 NOTE — TELEPHONE ENCOUNTER
Left a second message. Will need an audiogram prior to scheduling procedure as will plan to remove right PE tube.

## 2021-11-04 ENCOUNTER — TELEPHONE (OUTPATIENT)
Dept: OTOLARYNGOLOGY | Facility: CLINIC | Age: 46
End: 2021-11-04

## 2021-11-04 NOTE — TELEPHONE ENCOUNTER
Per pt tried scheduling HT but next available was 12/8, asking if Dr. Haja Abarca can assist with getting sooner appt. Please advise thank you.

## 2021-11-08 ENCOUNTER — PATIENT MESSAGE (OUTPATIENT)
Dept: OTOLARYNGOLOGY | Facility: CLINIC | Age: 46
End: 2021-11-08

## 2021-11-10 ENCOUNTER — OFFICE VISIT (OUTPATIENT)
Dept: AUDIOLOGY | Facility: CLINIC | Age: 46
End: 2021-11-10
Payer: COMMERCIAL

## 2021-11-10 DIAGNOSIS — H90.6 MIXED HEARING LOSS, BILATERAL: Primary | ICD-10-CM

## 2021-11-10 PROCEDURE — 92557 COMPREHENSIVE HEARING TEST: CPT | Performed by: AUDIOLOGIST

## 2021-11-10 PROCEDURE — 92567 TYMPANOMETRY: CPT | Performed by: AUDIOLOGIST

## 2021-11-11 NOTE — TELEPHONE ENCOUNTER
Left a message. As, not seen since September, I would like to reassess her prior to scheduling the surgery.

## 2021-11-16 ENCOUNTER — OFFICE VISIT (OUTPATIENT)
Dept: OTOLARYNGOLOGY | Facility: CLINIC | Age: 46
End: 2021-11-16
Payer: COMMERCIAL

## 2021-11-16 ENCOUNTER — TELEPHONE (OUTPATIENT)
Dept: OTOLARYNGOLOGY | Facility: CLINIC | Age: 46
End: 2021-11-16

## 2021-11-16 VITALS — HEIGHT: 67 IN | BODY MASS INDEX: 32.02 KG/M2 | WEIGHT: 204 LBS

## 2021-11-16 DIAGNOSIS — J35.2 ENLARGED ADENOIDS: Primary | ICD-10-CM

## 2021-11-16 DIAGNOSIS — J34.2 NASAL SEPTAL DEVIATION: ICD-10-CM

## 2021-11-16 DIAGNOSIS — H92.11 OTORRHEA OF RIGHT EAR: ICD-10-CM

## 2021-11-16 DIAGNOSIS — H92.11 CHRONIC OTORRHEA OF RIGHT EAR: ICD-10-CM

## 2021-11-16 DIAGNOSIS — J35.2 ADENOIDS, HYPERTROPHY: Primary | ICD-10-CM

## 2021-11-16 PROCEDURE — 31231 NASAL ENDOSCOPY DX: CPT | Performed by: SPECIALIST

## 2021-11-16 PROCEDURE — 99213 OFFICE O/P EST LOW 20 MIN: CPT | Performed by: SPECIALIST

## 2021-11-16 PROCEDURE — 3008F BODY MASS INDEX DOCD: CPT | Performed by: SPECIALIST

## 2021-11-16 NOTE — PATIENT INSTRUCTIONS
You have an enlarged adenoid pad. Examination of the eustachian tubes shows that they may have been damaged during the prior adenoidectomy. There is still some granulation tissue around the right PE tube.   The left tympanic membrane is intact, however mo

## 2021-11-16 NOTE — TELEPHONE ENCOUNTER
Per pt saw Dr. Morris Spann today and was told surgery needed to be scheduled, states Dr. Morris Spann promised pt a call within the hour, pt states it has been two hours. Please call thank you.

## 2021-11-16 NOTE — PROGRESS NOTES
Angie Eddy is a 48year old female. Patient presents with: Follow - Up: pt presents today for f/u on otorrhea of right ear. pt c/o congested today. HPI:   Retained right PE tube for 14 years. There is intermittent otorrhea.   She had a ton Seizure disorder [facility])     seizure x1 2/2018      Social History:  Social History    Tobacco Use      Smoking status: Current Every Day Smoker        Packs/day: 0.50        Years: 30.00        Pack years: 15      Smokeless tobacco: Never Used    Vaping Use Lymph Detail Submental. Submandibular. Anterior cervical. Posterior cervical. Supraclavicular all without enlargement   Psychiatric Orientation - Oriented to time, place, person & situation. Appropriate mood and affect.    Nasopharynx  right eustachian tu

## 2021-11-18 RX ORDER — IBUPROFEN 800 MG/1
800 TABLET ORAL EVERY 6 HOURS PRN
COMMUNITY

## 2021-11-18 RX ORDER — METOCLOPRAMIDE 10 MG/1
10 TABLET ORAL ONCE
Status: CANCELLED | OUTPATIENT
Start: 2021-11-18 | End: 2021-11-18

## 2021-11-19 ENCOUNTER — LAB ENCOUNTER (OUTPATIENT)
Dept: LAB | Age: 46
End: 2021-11-19
Attending: SPECIALIST
Payer: COMMERCIAL

## 2021-11-19 DIAGNOSIS — Z01.818 PREOPERATIVE TESTING: ICD-10-CM

## 2021-11-19 NOTE — H&P
[facility]    History and Physical    Bard Tiffanielizette Patient Status:  Hospital Outpatient Surgery    3/15/1975 MRN [id number]   Location [place] [address] Attending Rhonda Fortune MD   Hosp Day # 0 PCP [facility] Salicylic Acid-Sulf*    SWELLING    Comment:Other  Morphine                NAUSEA ONLY, OTHER (SEE COMMENTS)  Sulfa Antibiotics       RASH  No medications prior to admission.       Review of Systems:   Review of Systems   Chronic drainage in the right ear

## 2021-11-22 ENCOUNTER — ANESTHESIA EVENT (OUTPATIENT)
Dept: SURGERY | Facility: HOSPITAL | Age: 46
End: 2021-11-22
Payer: COMMERCIAL

## 2021-11-22 ENCOUNTER — HOSPITAL ENCOUNTER (OUTPATIENT)
Facility: HOSPITAL | Age: 46
Setting detail: HOSPITAL OUTPATIENT SURGERY
Discharge: HOME | End: 2021-11-22
Attending: SPECIALIST | Admitting: SPECIALIST
Payer: COMMERCIAL

## 2021-11-22 ENCOUNTER — ANESTHESIA (OUTPATIENT)
Dept: SURGERY | Facility: HOSPITAL | Age: 46
End: 2021-11-22
Payer: COMMERCIAL

## 2021-11-22 VITALS
WEIGHT: 243 LBS | OXYGEN SATURATION: 95 % | HEIGHT: 67 IN | BODY MASS INDEX: 38.14 KG/M2 | HEART RATE: 94 BPM | RESPIRATION RATE: 16 BRPM | TEMPERATURE: 98 F | DIASTOLIC BLOOD PRESSURE: 80 MMHG | SYSTOLIC BLOOD PRESSURE: 129 MMHG

## 2021-11-22 DIAGNOSIS — J35.2 ADENOIDS, HYPERTROPHY: ICD-10-CM

## 2021-11-22 DIAGNOSIS — Z01.818 PREOPERATIVE TESTING: Primary | ICD-10-CM

## 2021-11-22 DIAGNOSIS — Z01.818 PREOP TESTING: ICD-10-CM

## 2021-11-22 DIAGNOSIS — H92.11 CHRONIC OTORRHEA OF RIGHT EAR: ICD-10-CM

## 2021-11-22 PROCEDURE — 42831 REMOVAL OF ADENOIDS: CPT | Performed by: SPECIALIST

## 2021-11-22 PROCEDURE — 69424 REMOVE VENTILATING TUBE: CPT | Performed by: SPECIALIST

## 2021-11-22 PROCEDURE — 0CTQXZZ RESECTION OF ADENOIDS, EXTERNAL APPROACH: ICD-10-PCS | Performed by: SPECIALIST

## 2021-11-22 PROCEDURE — 09PH7YZ REMOVAL OF OTHER DEVICE FROM RIGHT EAR, VIA NATURAL OR ARTIFICIAL OPENING: ICD-10-PCS | Performed by: SPECIALIST

## 2021-11-22 RX ORDER — HYDROMORPHONE HYDROCHLORIDE 1 MG/ML
0.4 INJECTION, SOLUTION INTRAMUSCULAR; INTRAVENOUS; SUBCUTANEOUS EVERY 5 MIN PRN
Status: DISCONTINUED | OUTPATIENT
Start: 2021-11-22 | End: 2021-11-22

## 2021-11-22 RX ORDER — ONDANSETRON 2 MG/ML
INJECTION INTRAMUSCULAR; INTRAVENOUS AS NEEDED
Status: DISCONTINUED | OUTPATIENT
Start: 2021-11-22 | End: 2021-11-22 | Stop reason: SURG

## 2021-11-22 RX ORDER — SODIUM CHLORIDE, SODIUM LACTATE, POTASSIUM CHLORIDE, CALCIUM CHLORIDE 600; 310; 30; 20 MG/100ML; MG/100ML; MG/100ML; MG/100ML
INJECTION, SOLUTION INTRAVENOUS CONTINUOUS
Status: DISCONTINUED | OUTPATIENT
Start: 2021-11-22 | End: 2021-11-22

## 2021-11-22 RX ORDER — MORPHINE SULFATE 10 MG/ML
6 INJECTION, SOLUTION INTRAMUSCULAR; INTRAVENOUS EVERY 10 MIN PRN
Status: DISCONTINUED | OUTPATIENT
Start: 2021-11-22 | End: 2021-11-22

## 2021-11-22 RX ORDER — DEXAMETHASONE SODIUM PHOSPHATE 4 MG/ML
VIAL (ML) INJECTION AS NEEDED
Status: DISCONTINUED | OUTPATIENT
Start: 2021-11-22 | End: 2021-11-22 | Stop reason: SURG

## 2021-11-22 RX ORDER — HALOPERIDOL 5 MG/ML
0.25 INJECTION INTRAMUSCULAR ONCE AS NEEDED
Status: DISCONTINUED | OUTPATIENT
Start: 2021-11-22 | End: 2021-11-22

## 2021-11-22 RX ORDER — NALOXONE HYDROCHLORIDE 0.4 MG/ML
80 INJECTION, SOLUTION INTRAMUSCULAR; INTRAVENOUS; SUBCUTANEOUS AS NEEDED
Status: DISCONTINUED | OUTPATIENT
Start: 2021-11-22 | End: 2021-11-22

## 2021-11-22 RX ORDER — MORPHINE SULFATE 4 MG/ML
2 INJECTION, SOLUTION INTRAMUSCULAR; INTRAVENOUS EVERY 10 MIN PRN
Status: DISCONTINUED | OUTPATIENT
Start: 2021-11-22 | End: 2021-11-22

## 2021-11-22 RX ORDER — ACETAMINOPHEN 500 MG
1000 TABLET ORAL ONCE
Status: COMPLETED | OUTPATIENT
Start: 2021-11-22 | End: 2021-11-22

## 2021-11-22 RX ORDER — ONDANSETRON 2 MG/ML
4 INJECTION INTRAMUSCULAR; INTRAVENOUS ONCE AS NEEDED
Status: DISCONTINUED | OUTPATIENT
Start: 2021-11-22 | End: 2021-11-22

## 2021-11-22 RX ORDER — AZITHROMYCIN 250 MG/1
TABLET, FILM COATED ORAL
Qty: 6 TABLET | Refills: 0 | Status: SHIPPED | OUTPATIENT
Start: 2021-11-22

## 2021-11-22 RX ORDER — HYDROMORPHONE HYDROCHLORIDE 1 MG/ML
0.6 INJECTION, SOLUTION INTRAMUSCULAR; INTRAVENOUS; SUBCUTANEOUS EVERY 5 MIN PRN
Status: DISCONTINUED | OUTPATIENT
Start: 2021-11-22 | End: 2021-11-22

## 2021-11-22 RX ORDER — HYDROCODONE BITARTRATE AND ACETAMINOPHEN 5; 325 MG/1; MG/1
2 TABLET ORAL AS NEEDED
Status: COMPLETED | OUTPATIENT
Start: 2021-11-22 | End: 2021-11-22

## 2021-11-22 RX ORDER — HYDROCODONE BITARTRATE AND ACETAMINOPHEN 10; 325 MG/1; MG/1
1-2 TABLET ORAL EVERY 4 HOURS PRN
Qty: 20 TABLET | Refills: 0 | Status: SHIPPED | OUTPATIENT
Start: 2021-11-22

## 2021-11-22 RX ORDER — HYDROMORPHONE HYDROCHLORIDE 1 MG/ML
0.2 INJECTION, SOLUTION INTRAMUSCULAR; INTRAVENOUS; SUBCUTANEOUS EVERY 5 MIN PRN
Status: DISCONTINUED | OUTPATIENT
Start: 2021-11-22 | End: 2021-11-22

## 2021-11-22 RX ORDER — MORPHINE SULFATE 4 MG/ML
4 INJECTION, SOLUTION INTRAMUSCULAR; INTRAVENOUS EVERY 10 MIN PRN
Status: DISCONTINUED | OUTPATIENT
Start: 2021-11-22 | End: 2021-11-22

## 2021-11-22 RX ORDER — HYDROCODONE BITARTRATE AND ACETAMINOPHEN 5; 325 MG/1; MG/1
1 TABLET ORAL AS NEEDED
Status: COMPLETED | OUTPATIENT
Start: 2021-11-22 | End: 2021-11-22

## 2021-11-22 RX ORDER — PROCHLORPERAZINE EDISYLATE 5 MG/ML
5 INJECTION INTRAMUSCULAR; INTRAVENOUS ONCE AS NEEDED
Status: DISCONTINUED | OUTPATIENT
Start: 2021-11-22 | End: 2021-11-22

## 2021-11-22 RX ORDER — OFLOXACIN 3 MG/ML
SOLUTION AURICULAR (OTIC) AS NEEDED
Status: DISCONTINUED | OUTPATIENT
Start: 2021-11-22 | End: 2021-11-22 | Stop reason: HOSPADM

## 2021-11-22 RX ORDER — FAMOTIDINE 20 MG/1
20 TABLET ORAL ONCE
Status: COMPLETED | OUTPATIENT
Start: 2021-11-22 | End: 2021-11-22

## 2021-11-22 RX ORDER — LIDOCAINE HYDROCHLORIDE 10 MG/ML
INJECTION, SOLUTION EPIDURAL; INFILTRATION; INTRACAUDAL; PERINEURAL AS NEEDED
Status: DISCONTINUED | OUTPATIENT
Start: 2021-11-22 | End: 2021-11-22 | Stop reason: SURG

## 2021-11-22 RX ADMIN — ONDANSETRON 4 MG: 2 INJECTION INTRAMUSCULAR; INTRAVENOUS at 14:58:00

## 2021-11-22 RX ADMIN — DEXAMETHASONE SODIUM PHOSPHATE 8 MG: 4 MG/ML VIAL (ML) INJECTION at 14:40:00

## 2021-11-22 RX ADMIN — SODIUM CHLORIDE, SODIUM LACTATE, POTASSIUM CHLORIDE, CALCIUM CHLORIDE: 600; 310; 30; 20 INJECTION, SOLUTION INTRAVENOUS at 15:21:00

## 2021-11-22 RX ADMIN — LIDOCAINE HYDROCHLORIDE 50 MG: 10 INJECTION, SOLUTION EPIDURAL; INFILTRATION; INTRACAUDAL; PERINEURAL at 14:40:00

## 2021-11-22 RX ADMIN — SODIUM CHLORIDE, SODIUM LACTATE, POTASSIUM CHLORIDE, CALCIUM CHLORIDE: 600; 310; 30; 20 INJECTION, SOLUTION INTRAVENOUS at 14:40:00

## 2021-11-22 NOTE — ANESTHESIA PREPROCEDURE EVALUATION
Anesthesia PreOp Note    HPI:     Ang Obrien is a 48year old female who presents for preoperative consultation requested by: Dotty Dubin, MD    Date of Surgery: 11/22/2021    Procedure(s):   ADENOIDECTOMY, REMOVAL OF RIGHT EAR PRESSURE EQUAL Right 2011    elbow replacement   • OTHER Bilateral 2010    foot fusion   • OTHER Bilateral     ovarian cystectomy   • TONSILLECTOMY      as a child   • TOTAL KNEE REPLACEMENT  2008    bilateral       Probiotic Product (PRO-BIOTIC BLEND) Oral Cap, Take by file      Number of children: Not on file      Years of education: Not on file      Highest education level: Not on file    Occupational History      Not on file    Tobacco Use      Smoking status: Current Every Day Smoker        Packs/day: 0.50        Yea normal exam               Anesthesia Plan:   ASA:  3  Plan:   General  Airway:  ETT and Video laryngoscope  Post-op Pain Management: IV analgesics  Informed Consent Plan and Risks Discussed With:  Patient      I have informed Jose Grief and/or

## 2021-11-22 NOTE — INTERVAL H&P NOTE
Pre-op Diagnosis: Adenoids, hypertrophy [J35.2]  Chronic otorrhea of right ear [H92.11]    The above referenced H&P was reviewed by Rowena Cedillo MD on 11/22/2021, the patient was examined and no significant changes have occurred in the patient's conditi

## 2021-11-22 NOTE — ANESTHESIA POSTPROCEDURE EVALUATION
Patient: Diana Shannon    Procedure Summary     Date: 11/22/21 Room / Location: [address] [place] [address] MAIN OR 16 / [address] [place] [address] MAIN OR    Anesthesia Start: 9879 Anesthesia Stop:     Procedures:       ADENOIDECTOMY, REMOVAL OF RIGHT EAR PRESSURE EQUALIZING TUBE (N/A Ear)

## 2021-11-22 NOTE — ANESTHESIA PROCEDURE NOTES
Airway  Date/Time: 11/22/2021 2:42 PM  Urgency: Elective    Difficult airway    General Information and Staff    Patient location during procedure: OR  Anesthesiologist: Danielle Avila MD  Performed: anesthesiologist     Indications and Patient Condition

## 2021-11-22 NOTE — BRIEF OP NOTE
Pre-Operative Diagnosis: Adenoids, hypertrophy [J35.2]  Chronic otorrhea of right ear [H92.11]     Post-Operative Diagnosis: Adenoids, hypertrophy [X91. 2]Chronic otorrhea of right ear [H92.11]      Procedure Performed:   ADENOIDECTOMY, REMOVAL OF RIGHT EAR

## 2021-11-23 ENCOUNTER — TELEPHONE (OUTPATIENT)
Dept: OTOLARYNGOLOGY | Facility: CLINIC | Age: 46
End: 2021-11-23

## 2021-11-23 NOTE — OPERATIVE REPORT
[facility]    PATIENT'S NAME: Woodford Simmonds   ATTENDING PHYSICIAN: Dayne Sullivan MD   OPERATING PHYSICIAN: Dayne Sullivan MD   PATIENT ACCOUNT#:   [de-identified]    LOCATION:  [place] [address]  MEDICAL RECORD #:   [id number]       DA mouth.  The patient's mouth had a very, very small opening. McIvor mouth gag was placed in the oral cavity with care to avoid injury to lips, teeth, and tongue, and the patient was then suspended.   After this was done, red rubber catheter was placed to th

## 2021-11-23 NOTE — TELEPHONE ENCOUNTER
Pt called stating pt had surgery yesterday 11-22-21. Pt has questions. What can pt eat today?   Please call pt

## 2021-11-23 NOTE — TELEPHONE ENCOUNTER
Post op day 1 adenoidectomy and removal of right ear PE tube,pt stated she is doing fine no bleeding no fever ,reviewed post op medications,reviewed Dr Carlene Billy  post op discharge instructions(please see after visit summary),increase fluid , can eat soft fo

## 2022-01-12 ENCOUNTER — TELEPHONE (OUTPATIENT)
Dept: OTOLARYNGOLOGY | Facility: CLINIC | Age: 47
End: 2022-01-12

## 2022-01-12 NOTE — TELEPHONE ENCOUNTER
Per pt was not able to go to her post-op due to a family emergency and [place]. Per pt asking to speak to Dr. Suzanne Perez or nurse to give an update on condition.  Please advise

## 2022-01-14 NOTE — TELEPHONE ENCOUNTER
Amador Abraham pt had adenoidectomy and PE removal of right ear in end of November. , pt calling to state she missed post op appt due to her mother having cancer, pt currently in [place] and does not know when she will return. Per pt nose feels better, pt breathing clearly, pt does feel PND go down her nose, not congested. Per pt hearing is still muffled. Advised pt she can schedule an appointment when she returns to [place]. Please advise if any further recommendations.

## 2022-01-14 NOTE — TELEPHONE ENCOUNTER
Sorry about her mother. I will call her. She feels too dry. She will try flonase sensemist otc. She will follow up when back in town.

## 2022-01-18 ENCOUNTER — TELEPHONE (OUTPATIENT)
Dept: OTOLARYNGOLOGY | Facility: CLINIC | Age: 47
End: 2022-01-18

## 2022-01-18 NOTE — TELEPHONE ENCOUNTER
Per pt had piece of hearing aid stuck in her right ear. Pt had it removed and states ear started bleeding. Pt put antibiotic drops last night and woke up today with sore ear. States put a q-tip in ear and there is still blood.  Please advise

## 2022-01-18 NOTE — TELEPHONE ENCOUNTER
Per pt currently out of state, pt had hearing aid right ear, family member removed and started bleeding. Bleeding stopped, pt used old ear drops but feels very sore. Advised pt will need to go to Urgent Care in [place] , and should be assessed.  Pt verbal

## 2022-08-02 NOTE — CONSULTS
@CT [id number]   [facility]OSP - [facility]    Report of Consultation    Hi Crisostomo Patient Status:  Inpatient    3/15/1975 MRN [id number]   Location [address] [facility] Attending Viki Ye MD   Hosp Day # 0 PCP Lauren Beaulieu     Date of Admission:   increased irritability, restlessness and agitation. The patient also indicated that she got upset she get immediately hopeless and tearful and cannot control her emotion.   Patient otherwise reported that she does not drink that often but couple time a mon month    Drug use: Yes      Types: Cannabis      Comment: medical marijuana          Current Medications:    Current Facility-Administered Medications:  lamoTRIgine (LAMICTAL) tab 25 mg 25 mg Oral BID   ChlordiazePOXIDE HCl (LIBRIUM) cap 5 mg 5 mg Oral Q8H  02/01/2019    K 3.5 02/01/2019     02/01/2019    CO2 22 02/01/2019    GLU 93 02/01/2019    CA 8.7 02/01/2019    MG 2.1 02/01/2019    TROP 0.00 01/31/2019    ETOH 1 01/31/2019         Imaging:  Ct Brain Or Head ([id number])    Result Date: 1/31/2 consultation recommended. 3. Small right maxillary sinus retention, and mild chronic ethmoid sinusitis. 4. Trace mastoid effusions. 5. Otherwise negative.      Dictated by (CST): Corine Love MD on 2/01/2019 at 7:50     Approved by (CST): Shannan Shook seizure. 5.  Start Lamictal 25 mg twice daily to stabilize the mood and not as antiseizure medication. 6.  Start Seroquel 50 mg nightly to help with dressings thought and insomnia.   7.  Patient educated about diagnosis, risk and benefit and medication an

## 2022-08-18 NOTE — ANESTHESIA PROCEDURE NOTES
Spinal Block  Date/Time: 2/25/2020 8:07 AM  Performed by: Sandra Bailey, CRNA  Authorized by: Mitchell Thomas DO       General Information and Staff    Start Time:  2/25/2020 8:03 AM  End Time:  2/25/2020 8:07 AM  Anesthesiologist:  Mitchell Thomas,
No

## (undated) DEVICE — TRI FLAT PIN-MED

## (undated) DEVICE — PROXIMATE SKIN STAPLERS (35 WIDE) CONTAINS 35 STAINLESS STEEL STAPLES (FIXED HEAD): Brand: PROXIMATE

## (undated) DEVICE — SUCTION CANISTER, 3000CC,SAFELINER: Brand: DEROYAL

## (undated) DEVICE — REM POLYHESIVE ADULT PATIENT RETURN ELECTRODE: Brand: VALLEYLAB

## (undated) DEVICE — COTTON BALLS: Brand: DEROYAL

## (undated) DEVICE — CATHETER,URETHRAL,REDRUBBER,STRL,12FR: Brand: MEDLINE INDUSTRIES, INC.

## (undated) DEVICE — GAMMEX® NON-LATEX PI ORTHO SIZE 9, STERILE POLYISOPRENE POWDER-FREE SURGICAL GLOVE: Brand: GAMMEX

## (undated) DEVICE — COTTON ROLL: Brand: DEROYAL

## (undated) DEVICE — TOWEL SURG OR 17X30IN BLUE

## (undated) DEVICE — GAMMEX® PI HYBRID SIZE 6.5, STERILE POWDER-FREE SURGICAL GLOVE, POLYISOPRENE AND NEOPRENE BLEND: Brand: GAMMEX

## (undated) DEVICE — FLEXIBLE YANKAUER,MEDIUM TIP, NO VACUUM CONTROL: Brand: ARGYLE

## (undated) DEVICE — CULTURE TUBE ANAEROBIC

## (undated) DEVICE — SPONGE LAP 18X18 XRAY STRL

## (undated) DEVICE — CHLORAPREP 26ML APPLICATOR

## (undated) DEVICE — ADHESIVE MASTISOL 2/3CC VL

## (undated) DEVICE — GAMMEX® PI HYBRID SIZE 7.5, STERILE POWDER-FREE SURGICAL GLOVE, POLYISOPRENE AND NEOPRENE BLEND: Brand: GAMMEX

## (undated) DEVICE — 2T11 #2 PDO 36 X 36: Brand: 2T11 #2 PDO 36 X 36

## (undated) DEVICE — 2DE14 2-0 PDO 24 X 24: Brand: 2DE14 2-0 PDO 24 X 24

## (undated) DEVICE — 2DE14 2-0 PDO 14X14: Brand: 2DE14 2-0 PDO 14X14

## (undated) DEVICE — SOL  .9 1000ML BTL

## (undated) DEVICE — CATH RED RUBBER 16FR S

## (undated) DEVICE — 6.0MM ACORN

## (undated) DEVICE — BATTERY

## (undated) DEVICE — CEMENT MIXING SYSTEM WITH FEMORAL BREAKWAY NOZZLE: Brand: REVOLUTION

## (undated) DEVICE — POLAR CARE CUBE COOLING UNIT

## (undated) DEVICE — GAMMEX® NON-LATEX PI ORTHO SIZE 8.5, STERILE POLYISOPRENE POWDER-FREE SURGICAL GLOVE: Brand: GAMMEX

## (undated) DEVICE — CULTURE COLLECT/TRANSPORT SYS

## (undated) DEVICE — KIT DRN 3/16IN PVC DRN 3 SPRG

## (undated) DEVICE — BLADE SAW SAGITTAL 19.5

## (undated) DEVICE — ESMARK: Brand: DEROYAL

## (undated) DEVICE — MEDI-VAC NON-CONDUCTIVE SUCTION TUBING: Brand: CARDINAL HEALTH

## (undated) DEVICE — DERMABOND LIQUID ADHESIVE

## (undated) DEVICE — TONGUE DEPRESSOR 6IN STR

## (undated) DEVICE — GAMMEX® NON-LATEX PI ORTHO SIZE 6.5, STERILE POLYISOPRENE POWDER-FREE SURGICAL GLOVE: Brand: GAMMEX

## (undated) DEVICE — 20 ML SYRINGE LUER-LOCK TIP: Brand: MONOJECT

## (undated) DEVICE — CONTAINER SPEC STR 4OZ GRY LID

## (undated) DEVICE — BANDAGE FLXMSTR 11YDX6IN STRL

## (undated) DEVICE — ENCORE® LATEX MICRO SIZE 7, STERILE LATEX POWDER-FREE SURGICAL GLOVE: Brand: ENCORE

## (undated) DEVICE — STANDARD HYPODERMIC NEEDLE,ALUMINUM HUB: Brand: MONOJECT

## (undated) DEVICE — SOL  .9 3000ML

## (undated) DEVICE — TOURNIQUET CUFF 34 STR

## (undated) DEVICE — SUTURE VICRYL 2-0 FS-1

## (undated) DEVICE — GAMMEX® PI HYBRID SIZE 8.5, STERILE POWDER-FREE SURGICAL GLOVE, POLYISOPRENE AND NEOPRENE BLEND: Brand: GAMMEX

## (undated) DEVICE — 3M™ STERI-STRIP™ REINFORCED ADHESIVE SKIN CLOSURES, R1547, 1/2 IN X 4 IN (12 MM X 100 MM), 6 STRIPS/ENVELOPE: Brand: 3M™ STERI-STRIP™

## (undated) DEVICE — VIOLET BRAIDED (POLYGLACTIN 910), SYNTHETIC ABSORBABLE SUTURE: Brand: COATED VICRYL

## (undated) DEVICE — DUAL CUT SAGITTAL BLADE

## (undated) DEVICE — Device

## (undated) DEVICE — BANDAGE ROLL,100% COTTON, 6 PLY, LARGE: Brand: KERLIX

## (undated) DEVICE — TOTAL KNEE: Brand: MEDLINE INDUSTRIES, INC.

## (undated) DEVICE — 75MM TROCAR SMOOTH PIN-D

## (undated) DEVICE — NEEDLE SPINAL 22X3-1/2 BLK

## (undated) DEVICE — GAMMEX® PI HYBRID SIZE 9, STERILE POWDER-FREE SURGICAL GLOVE, POLYISOPRENE AND NEOPRENE BLEND: Brand: GAMMEX

## (undated) DEVICE — T5 HOOD WITH PEEL AWAY FACE SHIELD

## (undated) DEVICE — PAD THRP 16IN WRPON MU LNG STM

## (undated) DEVICE — T&A: Brand: MEDLINE INDUSTRIES, INC.

## (undated) NOTE — LETTER
[address] [facility], [address]  ?  02/02/19  ? Re: Isabelle Riley  ? To Whom It May Concern: Isabelle Riley was admitted to [facility] from 1/31/19 to 02/02/19.     Please excuse Isabelle Riley from attending